# Patient Record
Sex: MALE | Race: WHITE | Employment: FULL TIME | URBAN - METROPOLITAN AREA
[De-identification: names, ages, dates, MRNs, and addresses within clinical notes are randomized per-mention and may not be internally consistent; named-entity substitution may affect disease eponyms.]

---

## 2017-01-26 ENCOUNTER — OFFICE VISIT (OUTPATIENT)
Dept: FAMILY MEDICINE CLINIC | Age: 37
End: 2017-01-26

## 2017-01-26 VITALS
BODY MASS INDEX: 26.35 KG/M2 | TEMPERATURE: 99.8 F | WEIGHT: 188.2 LBS | RESPIRATION RATE: 18 BRPM | DIASTOLIC BLOOD PRESSURE: 85 MMHG | HEIGHT: 71 IN | HEART RATE: 74 BPM | OXYGEN SATURATION: 98 % | SYSTOLIC BLOOD PRESSURE: 139 MMHG

## 2017-01-26 DIAGNOSIS — J45.20 MILD INTERMITTENT ASTHMA WITHOUT COMPLICATION: Primary | ICD-10-CM

## 2017-01-26 DIAGNOSIS — R73.03 PREDIABETES: ICD-10-CM

## 2017-01-26 DIAGNOSIS — F12.90 MARIJUANA USE: ICD-10-CM

## 2017-01-26 DIAGNOSIS — F32.A ANXIETY AND DEPRESSION: ICD-10-CM

## 2017-01-26 DIAGNOSIS — J44.9 CHRONIC OBSTRUCTIVE PULMONARY DISEASE, UNSPECIFIED COPD TYPE (HCC): ICD-10-CM

## 2017-01-26 DIAGNOSIS — E66.3 OVERWEIGHT (BMI 25.0-29.9): ICD-10-CM

## 2017-01-26 DIAGNOSIS — R61 NIGHT SWEATS: ICD-10-CM

## 2017-01-26 DIAGNOSIS — R11.2 NAUSEA AND VOMITING, INTRACTABILITY OF VOMITING NOT SPECIFIED, UNSPECIFIED VOMITING TYPE: ICD-10-CM

## 2017-01-26 DIAGNOSIS — F41.9 ANXIETY AND DEPRESSION: ICD-10-CM

## 2017-01-26 RX ORDER — SULFAMETHOXAZOLE AND TRIMETHOPRIM 400; 80 MG/1; MG/1
2 TABLET ORAL 2 TIMES DAILY
COMMUNITY
End: 2017-10-09 | Stop reason: ALTCHOICE

## 2017-01-26 NOTE — MR AVS SNAPSHOT
Visit Information Date & Time Provider Department Dept. Phone Encounter #  
 1/26/2017 11:15 AM Jennifer Bowles. Ingris Edwards MD [facility] [id number] Upcoming Health Maintenance Date Due DTaP/Tdap/Td series (2 - Td) 7/23/2025 Allergies as of 1/26/2017  Review Complete On: 1/26/2017 By: Jennifer Bowles. Ingris Edwards MD  
  
 Severity Noted Reaction Type Reactions Iodinated Contrast Media - Oral And Iv Dye High 03/02/2015   Systemic Hives  
 abruptly got multiple hives all over body Current Immunizations  Reviewed on 6/9/2016 Name Date Influenza Vaccine 9/9/2015 Pneumococcal Vaccine (Unspecified Type) 4/4/2012  2:25 PM  
 Tdap 7/23/2015 Not reviewed this visit You Were Diagnosed With   
  
 Codes Comments Mild intermittent asthma without complication    -  Primary ICD-10-CM: J45.20 ICD-9-CM: 493.90 Overweight (BMI 25.0-29. 9)     ICD-10-CM: I30.6 ICD-9-CM: 278.02 Anxiety and depression     ICD-10-CM: F41.9, F32.9 ICD-9-CM: 300.00, 311 Chronic obstructive pulmonary disease, unspecified COPD type ([facility]     ICD-10-CM: J44.9 ICD-9-CM: 012 Marijuana use     ICD-10-CM: F12.10 ICD-9-CM: 305.20 Night sweats     ICD-10-CM: R61 
ICD-9-CM: 780.8 Nausea and vomiting, intractability of vomiting not specified, unspecified vomiting type     ICD-10-CM: R11.2 ICD-9-CM: 787.01   
 Prediabetes     ICD-10-CM: R73.03 
ICD-9-CM: 790.29 Vitals BP Pulse Temp Resp Height(growth percentile) Weight(growth percentile) 139/85 (BP 1 Location: Left arm, BP Patient Position: Sitting) 74 99.8 °F (37.7 °C) (Oral) 18 5' 11\" (1.803 m) 188 lb 3.2 oz (85.4 kg) SpO2 BMI Smoking Status 98% 26.25 kg/m2 Former Smoker Vitals History BMI and BSA Data Body Mass Index Body Surface Area  
 26.25 kg/m 2 2.07 m 2 Preferred Pharmacy Pharmacy Name Phone  [place] [address] [phone] Your Updated Medication List  
  
   
This list is accurate as of: 1/26/17 12:16 PM.  Always use your most recent med list.  
  
  
  
  
 albuterol 90 mcg/actuation inhaler Commonly known as:  PROVENTIL HFA, VENTOLIN HFA, PROAIR HFA Take 2 Puffs by inhalation every four (4) hours as needed for Wheezing. BACTRIM  mg per tablet Generic drug:  trimethoprim-sulfamethoxazole Take 2 Tabs by mouth two (2) times a day. fluticasone-salmeterol 100-50 mcg/dose diskus inhaler Commonly known as:  ADVAIR Take 1 Puff by inhalation every twelve (12) hours. guaiFENesin  mg ER tablet Commonly known as:  Florentino & Florentino Take 600 mg by mouth daily. hydrOXYzine HCl 50 mg tablet Commonly known as:  ATARAX Take 1 Tab by mouth three (3) times daily as needed for Itching. PROTONIX 40 mg tablet Generic drug:  pantoprazole Take 40 mg by mouth daily. sertraline 100 mg tablet Commonly known as:  ZOLOFT Take 1 Tab by mouth daily. Indications: GENERALIZED ANXIETY DISORDER We Performed the Following CBC W/O DIFF [06663 CPT(R)] HEMOGLOBIN A1C WITH EAG [63525 CPT(R)] HEPATITIS C AB [65280 CPT(R)] HIV 1/2 AG/AB, 4TH GENERATION,W RFLX CONFIRM [[id number] Custom] METABOLIC PANEL, COMPREHENSIVE [27714 CPT(R)] TSH 3RD GENERATION [81163 CPT(R)] To-Do List   
 01/26/2017 PFT:  PFT DLCO   
  
 01/26/2017 PFT:  PULMONARY FUNCTION TEST   
  
 01/26/2017 Imaging:  XR CHEST PA LAT Patient Instructions TODAY, go to: LAB 
 CHECK OUT Please schedule the following appointments: 
· PFT, night sweat, GI, follow up with Dr. Abiel Stevens after Feb 16th ( on a Mon, Tues, or Wed) 
 
_____________________ Today you were seen for: 
 
For night sweats 
- have labs 
- you will need a TB test next time 
- have chest xray done For lung symptoms 
- have lung function tests done See GI 
 
 I recommend stopping marijuana 
_____________________ Review your health maintenance below. Make plans to return and address anything that is due or will be due soon. There are no preventive care reminders to display for this patient. Introducing [facility]! Temi Irizarry introduces [employer] patient portal. Now you can access parts of your medical record, email your doctor's office, and request medication refills online. 1. In your internet browser, go to [email or web address] 2. Click on the First Time User? Click Here link in the Sign In box. You will see the New Member Sign Up page. 3. Enter your [employer] Access Code exactly as it appears below. You will not need to use this code after youve completed the sign-up process. If you do not sign up before the expiration date, you must request a new code. · [employer] Access Code: [email or web address] Expires: 3/21/2017 11:17 AM 
 
4. Enter the last four digits of your Social Security Number (xxxx) and Date of Birth (mm/dd/yyyy) as indicated and click Submit. You will be taken to the next sign-up page. 5. Create a [employer] ID. This will be your [employer] login ID and cannot be changed, so think of one that is secure and easy to remember. 6. Create a [employer] password. You can change your password at any time. 7. Enter your Password Reset Question and Answer. This can be used at a later time if you forget your password. 8. Enter your e-mail address. You will receive e-mail notification when new information is available in [address] 9. Click Sign Up. You can now view and download portions of your medical record. 10. Click the Download Summary menu link to download a portable copy of your medical information. If you have questions, please visit the Frequently Asked Questions section of the [employer] website. Remember, [employer] is NOT to be used for urgent needs. For medical emergencies, dial 911. Now available from your iPhone and Android! Please provide this summary of care documentation to your next provider. Your primary care clinician is listed as Viry Bain. Sveta Womack. If you have any questions after today's visit, please call [phone].

## 2017-01-26 NOTE — PATIENT INSTRUCTIONS
TODAY, go to:   LAB   CHECK OUT    Please schedule the following appointments:  · PFT, night sweat, GI, follow up with Dr. Jonathan Franklin after Feb 16th ( on a Mon, Tues, or Wed)    _____________________     Today you were seen for:    For night sweats  - have labs  - you will need a TB test next time  - have chest xray done    For lung symptoms  - have lung function tests done    See GI    I recommend stopping marijuana  _____________________     Review your health maintenance below. Make plans to return and address anything that is due or will be due soon. There are no preventive care reminders to display for this patient.

## 2017-01-26 NOTE — PROGRESS NOTES
[address] Visit   Patient ID:   Riley Zavala is a 43 y.o. male. Assessment/Plan:    Dasia Mcneill was seen today for establish care. Diagnoses and all orders for this visit:    Mild intermittent asthma without complication  Chronic obstructive pulmonary disease, unspecified COPD type ([address]  pfts to eval lung function to direct therapy. Asthma vs COPD vs mixed  -     PULMONARY FUNCTION TEST; Future  -     PFT DLCO; Future    Overweight (BMI 25.0-29. 9)  Wt has improved. No longer obese. Body mass index is 26.25 kg/(m^2). Anxiety and depression  Managed with sertraline. Pt and partners feel that MJ helps mood also    Marijuana use  Discussed that this may contribute to his N/V, night sweats, lung sx. Encouraged to stop. Night sweats  Labs as ordered. CXR also. Will need PPD on f/u. Not today b/c going into weekend  -     TSH 3RD GENERATION  -     CBC W/O DIFF  -     XR CHEST PA LAT; Future  -     HEPATITIS C AB  -     HIV 1/2 AG/AB, 4TH GENERATION,W RFLX CONFIRM  -     METABOLIC PANEL, COMPREHENSIVE    Nausea and vomiting, intractability of vomiting not specified, unspecified vomiting type  F/u with GI as planned     Prediabetes  eval a1c.   -     HEMOGLOBIN A1C WITH EAG    Counselled pt on:  Patient health concerns. Patient was offered a choice/choices in the treatment plan today. Patient expresses understanding of the plan and agrees with recommendations. Patient Instructions   TODAY, go to:   LAB   CHECK OUT    Please schedule the following appointments:  · PFT, night sweat, GI, follow up with Dr. Ingris Edwards after Feb 16th ( on a Mon, Tues, or Wed)    _____________________     Today you were seen for:    For night sweats  - have labs  - you will need a TB test next time  - have chest xray done    For lung symptoms  - have lung function tests done    See GI    I recommend stopping marijuana  _____________________     Review your health maintenance below.  Make plans to return and address anything that is due or will be due soon. There are no preventive care reminders to display for this patient. ?  Subjective:   HPI:  Savanna Jewell is a 43 y.o. male being seen for:   Chief Complaint   Patient presents with   Venkatesh Gantitus Establish Care     Present with wife, girlfriend and son Leida Pereyra as well as a day care child today. Establish Care  Past medical history, surgical history, social history, family history, medications, allergies reviewed and updated. See below for more detail  Diaphoresis in sleep- has been in halfway X 2    COPD/Asthma  · Wakes with lots of mucus  · Taking advair and mucinex regularly    boil  · On bactrim from [facility]  · No i/d  · It is improving  · Changes bandaid after    Screening and Prevention Due:  There are no preventive care reminders to display for this patient. Past Medical History   Diagnosis Date    Asthma     Cellulitis and abscess of leg 7/9/2015    Chronic obstructive pulmonary disease ([facility] 2012     had collapsed lung and pna    Generalized anxiety disorder 7/9/2015    Hypokalemia 7/9/2015    Obesity (BMI 30-39. 9) 7/9/2015    Pneumomediastinum ([facility] 4/2/2012    Prediabetes 07/09/2015     6.0    Vomiting 09/10/2015      Kermit Emersonond-EGD&gstric emptying planned- seeing Dr. Sadiq Murrell- next Feb 2017       History reviewed. No pertinent past surgical history. Social History     Social History    Marital status:      Spouse name: see social hx    Number of children: 3    Years of education: N/A     Occupational History   [employer]     has worked in multiple areas     Social History Main Topics    Smoking status: Former Smoker     Packs/day: 0.25     Years: 22.00     Quit date: 10/23/2013    Smokeless tobacco: Never Used    Alcohol use No      Comment:  started drinking heavily in 2002. struggled with alcoholism. quit in 4892. no complicated w/d.     Drug use: Yes     Special: Marijuana      Comment: daily, using daily since about 2007  Sexual activity: Yes     Partners: Female     Birth control/ protection: IUD, Surgical      Comment: two partners. Other Topics Concern    Not on file     Social History Narrative    All live together in an apartment    [place]- g/f, 1 child- Robin Share (youngest) (+2 from a prior partner)    Marcela Patel- wife, 2 children         [place] 2012    Johnny Trammell [address] 2007    Surjit Balderas [facility]       Family History   Problem Relation Age of Onset    Diabetes Father     Diabetes Maternal Uncle     Cancer Maternal Uncle     Cancer Paternal Uncle     Cancer Mother      Uterine    Cancer Maternal Grandfather      leukemia    Diabetes Maternal Uncle     Kidney Disease Maternal Uncle      on dialysis      Review of Systems  Otherwise, per HPI  Active Problem List:  Patient Active Problem List   Diagnosis Code    Asthma J45.909    Chronic obstructive pulmonary disease ([facility] J44.9    Generalized anxiety disorder F41.1    Overweight (BMI 25.0-29. 9) E66.3    Anxiety and depression F41.9, F32.9    Prediabetes R73.03     ?   Objective:     Visit Vitals    /85 (BP 1 Location: Left arm, BP Patient Position: Sitting)    Pulse 74    Temp 99.8 °F (37.7 °C) (Oral)    Resp 18    Ht 5' 11\" (1.803 m)    Wt 188 lb 3.2 oz (85.4 kg)    SpO2 98%    BMI 26.25 kg/m2     PHQ 2 / 9, over the last two weeks 10/23/2015   Little interest or pleasure in doing things Not at all   Feeling down, depressed or hopeless Several days   Total Score PHQ 2 1   Trouble falling or staying asleep, or sleeping too much Several days   Feeling tired or having little energy Not at all   Poor appetite or overeating Not at all   Feeling bad about yourself - or that you are a failure or have let yourself or your family down Several days   Trouble concentrating on things such as school, work, reading or watching TV More than half the days   Moving or speaking so slowly that other people could have noticed; or the opposite being so fidgety that others notice Nearly every day   Thoughts of being better off dead, or hurting yourself in some way Not at all   PHQ 9 Score 8   How difficult have these problems made it for you to do your work, take care of your home and get along with others Not difficult at all       Physical Exam   Constitutional: He appears well-developed and well-nourished. No distress. Pulmonary/Chest: Effort normal.   Abdominal:   Abdomen with redundant skin, bandaid in place (over \"boil\")   Neurological: He is alert. Psychiatric: He has a normal mood and affect. His behavior is normal.     Allergies   Allergen Reactions    Iodinated Contrast Media - Oral And Iv Dye Hives     abruptly got multiple hives all over body      Prior to Admission medications    Medication Sig Start Date End Date Taking? Authorizing Provider   trimethoprim-sulfamethoxazole (BACTRIM)  mg per tablet Take 2 Tabs by mouth two (2) times a day. Yes Historical Provider   hydrOXYzine HCl (ATARAX) 50 mg tablet Take 1 Tab by mouth three (3) times daily as needed for Itching. 12/21/16  Yes Zay Giles NP   fluticasone-salmeterol (ADVAIR) 100-50 mcg/dose diskus inhaler Take 1 Puff by inhalation every twelve (12) hours. 12/21/16  Yes Zay Giles NP   sertraline (ZOLOFT) 100 mg tablet Take 1 Tab by mouth daily. Indications: GENERALIZED ANXIETY DISORDER 12/21/16  Yes Zay Giles NP   albuterol (PROVENTIL HFA, VENTOLIN HFA, PROAIR HFA) 90 mcg/actuation inhaler Take 2 Puffs by inhalation every four (4) hours as needed for Wheezing. 12/21/16  Yes Zay Giles NP   pantoprazole (PROTONIX) 40 mg tablet Take 40 mg by mouth daily. Yes Historical Provider   guaiFENesin ER (MUCINEX) 600 mg ER tablet Take 600 mg by mouth daily.    Yes Historical Provider

## 2017-01-26 NOTE — LETTER
6/23/2017 11:41 AM 
 
Mr. Briana Min Dr Gentry Krabbe [phone] [facility] [address] Dear Marly Warner: 
 
Please find your most recent results below. Resulted Orders TSH 3RD GENERATION Result Value Ref Range TSH 1.670 0.450 - 4.500 uIU/mL Narrative Performed at:  [address]  [id number] : Lilia Mata MD, Phone:  [phone] CBC W/O DIFF Result Value Ref Range WBC 6.1 3.4 - 10.8 x10E3/uL  
 RBC 4.42 4.14 - 5.80 x10E6/uL HGB 12.8 12.6 - 17.7 g/dL HCT 39.3 37.5 - 51.0 % MCV 89 79 - 97 fL  
 MCH 29.0 26.6 - 33.0 pg  
 MCHC 32.6 31.5 - 35.7 g/dL  
 RDW 14.7 12.3 - 15.4 % PLATELET 121 883 - 540 x10E3/uL Narrative Performed at:  [address]  [id number] : Lilia Mata MD, Phone:  [phone] HEPATITIS C AB Result Value Ref Range Hep C Virus Ab <0.1 0.0 - 0.9 s/co ratio Comment:  
                                     Negative:     < 0.8 Indeterminate: 0.8 - 0.9 Positive:     > 0.9 The CDC recommends that a positive HCV antibody result 
 be followed up with a HCV Nucleic Acid Amplification 
 test (137219). Narrative Performed at:  [address]  [id number] : Lilia Mata MD, Phone:  [phone] HIV 1/2 AG/AB, 4TH GENERATION,W RFLX CONFIRM Result Value Ref Range HIV SCREEN 4TH GENERATION WRFX Non Reactive Non Reactive Narrative Performed at:  [address]  [id number] : Lilia Mata MD, Phone:  [phone] METABOLIC PANEL, COMPREHENSIVE Result Value Ref Range Glucose 88 65 - 99 mg/dL BUN 13 6 - 20 mg/dL Creatinine 0.88 0.76 - 1.27 mg/dL GFR est non- >59 mL/min/1.73  GFR est  >59 mL/min/1.73  
 BUN/Creatinine ratio 15 8 - 19 Sodium 137 134 - 144 mmol/L Potassium 4.8 3.5 - 5.2 mmol/L Chloride 98 96 - 106 mmol/L  
 CO2 24 18 - 29 mmol/L Calcium 9.5 8.7 - 10.2 mg/dL Protein, total 7.4 6.0 - 8.5 g/dL Albumin 4.4 3.5 - 5.5 g/dL GLOBULIN, TOTAL 3.0 1.5 - 4.5 g/dL A-G Ratio 1.5 1.1 - 2.5 Bilirubin, total 0.3 0.0 - 1.2 mg/dL Alk. phosphatase 72 39 - 117 IU/L  
 AST (SGOT) 22 0 - 40 IU/L  
 ALT (SGPT) 30 0 - 44 IU/L Narrative Performed at:  [address]  [id number] : Neil Murdock MD, Phone:  [phone] HEMOGLOBIN A1C WITH EAG Result Value Ref Range Hemoglobin A1c 5.5 4.8 - 5.6 % Comment:  
            Pre-diabetes: 5.7 - 6.4 Diabetes: >6.4 Glycemic control for adults with diabetes: <7.0 Estimated average glucose 111 mg/dL Narrative Performed at:  [address]  [id number] : Neil Murdock MD, Phone:  [phone] RECOMMENDATIONS: 
 
Your thyroid test is normal.  
Your red blood cell count is normal.  
Your white blood cell count is normal.  
Your platelet count is normal.  
Hepatitis C test is negative. HIV is negative. The test for HIV may not reflect exposures in the last 3-6 months. If you have had new partners or exposures in that time, I recommend a repeat test for HIV in 6 months. Your electrolytes are normal.  
Your kidney function is normal.  
Your liver function is normal.  
You do not have diabetes. Please call me if you have any questions: [phone] Sincerely, 
 
 
[address]  Altagracia Lepe MD

## 2017-01-26 NOTE — PROGRESS NOTES
Chief Complaint   Patient presents with   [facility] Establish Care     1. Have you been to the ER, urgent care clinic since your last visit? Hospitalized since your last visit? No    2. Have you seen or consulted any other health care providers outside of the [address] [place] [address] since your last visit? Include any pap smears or colon screening. No  I have reviewed Health Maintenance with the patient and updated. Advance Care Planning information reviewed and given to the patient.

## 2017-01-27 LAB
ALBUMIN SERPL-MCNC: 4.4 G/DL (ref 3.5–5.5)
ALBUMIN/GLOB SERPL: 1.5 {RATIO} (ref 1.1–2.5)
ALP SERPL-CCNC: 72 IU/L (ref 39–117)
ALT SERPL-CCNC: 30 IU/L (ref 0–44)
AST SERPL-CCNC: 22 IU/L (ref 0–40)
BILIRUB SERPL-MCNC: 0.3 MG/DL (ref 0–1.2)
BUN SERPL-MCNC: 13 MG/DL (ref 6–20)
BUN/CREAT SERPL: 15 (ref 8–19)
CALCIUM SERPL-MCNC: 9.5 MG/DL (ref 8.7–10.2)
CHLORIDE SERPL-SCNC: 98 MMOL/L (ref 96–106)
CO2 SERPL-SCNC: 24 MMOL/L (ref 18–29)
CREAT SERPL-MCNC: 0.88 MG/DL (ref 0.76–1.27)
ERYTHROCYTE [DISTWIDTH] IN BLOOD BY AUTOMATED COUNT: 14.7 % (ref 12.3–15.4)
EST. AVERAGE GLUCOSE BLD GHB EST-MCNC: 111 MG/DL
GLOBULIN SER CALC-MCNC: 3 G/DL (ref 1.5–4.5)
GLUCOSE SERPL-MCNC: 88 MG/DL (ref 65–99)
HBA1C MFR BLD: 5.5 % (ref 4.8–5.6)
HCT VFR BLD AUTO: 39.3 % (ref 37.5–51)
HCV AB S/CO SERPL IA: <0.1 S/CO RATIO (ref 0–0.9)
HGB BLD-MCNC: 12.8 G/DL (ref 12.6–17.7)
HIV 1+2 AB+HIV1 P24 AG SERPL QL IA: NON REACTIVE
MCH RBC QN AUTO: 29 PG (ref 26.6–33)
MCHC RBC AUTO-ENTMCNC: 32.6 G/DL (ref 31.5–35.7)
MCV RBC AUTO: 89 FL (ref 79–97)
PLATELET # BLD AUTO: 331 X10E3/UL (ref 150–379)
POTASSIUM SERPL-SCNC: 4.8 MMOL/L (ref 3.5–5.2)
PROT SERPL-MCNC: 7.4 G/DL (ref 6–8.5)
RBC # BLD AUTO: 4.42 X10E6/UL (ref 4.14–5.8)
SODIUM SERPL-SCNC: 137 MMOL/L (ref 134–144)
TSH SERPL DL<=0.005 MIU/L-ACNC: 1.67 UIU/ML (ref 0.45–4.5)
WBC # BLD AUTO: 6.1 X10E3/UL (ref 3.4–10.8)

## 2017-03-11 DIAGNOSIS — F32.A ANXIETY AND DEPRESSION: ICD-10-CM

## 2017-03-11 DIAGNOSIS — F41.9 ANXIETY AND DEPRESSION: ICD-10-CM

## 2017-03-15 RX ORDER — HYDROXYZINE 50 MG/1
TABLET, FILM COATED ORAL
Qty: 90 TAB | Refills: 0 | Status: SHIPPED | OUTPATIENT
Start: 2017-03-15 | End: 2017-10-09 | Stop reason: SDUPTHER

## 2017-05-20 ENCOUNTER — HOSPITAL ENCOUNTER (EMERGENCY)
Age: 37
Discharge: HOME | End: 2017-05-21
Attending: EMERGENCY MEDICINE
Payer: COMMERCIAL

## 2017-05-20 DIAGNOSIS — L25.9 CONTACT DERMATITIS AND OTHER ECZEMA, DUE TO UNSPECIFIED CAUSE: ICD-10-CM

## 2017-05-20 DIAGNOSIS — K52.9 GASTROENTERITIS, ACUTE: Primary | ICD-10-CM

## 2017-05-20 LAB
ALBUMIN SERPL BCP-MCNC: 4.3 G/DL (ref 3.5–5)
ALBUMIN/GLOB SERPL: 1.1 {RATIO} (ref 1.1–2.2)
ALP SERPL-CCNC: 73 U/L (ref 45–117)
ALT SERPL-CCNC: 37 U/L (ref 12–78)
ANION GAP BLD CALC-SCNC: 11 MMOL/L (ref 5–15)
AST SERPL W P-5'-P-CCNC: 14 U/L (ref 15–37)
BASOPHILS # BLD AUTO: 0 K/UL (ref 0–0.1)
BASOPHILS # BLD: 0 % (ref 0–1)
BILIRUB SERPL-MCNC: 0.7 MG/DL (ref 0.2–1)
BUN SERPL-MCNC: 15 MG/DL (ref 6–20)
BUN/CREAT SERPL: 17 (ref 12–20)
CALCIUM SERPL-MCNC: 9.7 MG/DL (ref 8.5–10.1)
CHLORIDE SERPL-SCNC: 105 MMOL/L (ref 97–108)
CO2 SERPL-SCNC: 21 MMOL/L (ref 21–32)
CREAT SERPL-MCNC: 0.87 MG/DL (ref 0.7–1.3)
EOSINOPHIL # BLD: 0 K/UL (ref 0–0.4)
EOSINOPHIL NFR BLD: 0 % (ref 0–7)
ERYTHROCYTE [DISTWIDTH] IN BLOOD BY AUTOMATED COUNT: 13.4 % (ref 11.5–14.5)
GLOBULIN SER CALC-MCNC: 3.9 G/DL (ref 2–4)
GLUCOSE SERPL-MCNC: 172 MG/DL (ref 65–100)
HCT VFR BLD AUTO: 40 % (ref 36.6–50.3)
HGB BLD-MCNC: 13.8 G/DL (ref 12.1–17)
LIPASE SERPL-CCNC: 213 U/L (ref 73–393)
LYMPHOCYTES # BLD AUTO: 11 % (ref 12–49)
LYMPHOCYTES # BLD: 1.5 K/UL (ref 0.8–3.5)
MCH RBC QN AUTO: 29.9 PG (ref 26–34)
MCHC RBC AUTO-ENTMCNC: 34.5 G/DL (ref 30–36.5)
MCV RBC AUTO: 86.8 FL (ref 80–99)
MONOCYTES # BLD: 1.2 K/UL (ref 0–1)
MONOCYTES NFR BLD AUTO: 9 % (ref 5–13)
NEUTS SEG # BLD: 10 K/UL (ref 1.8–8)
NEUTS SEG NFR BLD AUTO: 80 % (ref 32–75)
PLATELET # BLD AUTO: 268 K/UL (ref 150–400)
POTASSIUM SERPL-SCNC: 3.8 MMOL/L (ref 3.5–5.1)
PROT SERPL-MCNC: 8.2 G/DL (ref 6.4–8.2)
RBC # BLD AUTO: 4.61 M/UL (ref 4.1–5.7)
SODIUM SERPL-SCNC: 137 MMOL/L (ref 136–145)
WBC # BLD AUTO: 12.7 K/UL (ref 4.1–11.1)

## 2017-05-20 PROCEDURE — 81001 URINALYSIS AUTO W/SCOPE: CPT | Performed by: EMERGENCY MEDICINE

## 2017-05-20 PROCEDURE — 96375 TX/PRO/DX INJ NEW DRUG ADDON: CPT

## 2017-05-20 PROCEDURE — 85025 COMPLETE CBC W/AUTO DIFF WBC: CPT | Performed by: EMERGENCY MEDICINE

## 2017-05-20 PROCEDURE — C9113 INJ PANTOPRAZOLE SODIUM, VIA: HCPCS | Performed by: EMERGENCY MEDICINE

## 2017-05-20 PROCEDURE — 83690 ASSAY OF LIPASE: CPT | Performed by: EMERGENCY MEDICINE

## 2017-05-20 PROCEDURE — 96374 THER/PROPH/DIAG INJ IV PUSH: CPT

## 2017-05-20 PROCEDURE — 80053 COMPREHEN METABOLIC PANEL: CPT | Performed by: EMERGENCY MEDICINE

## 2017-05-20 PROCEDURE — 74011000250 HC RX REV CODE- 250: Performed by: EMERGENCY MEDICINE

## 2017-05-20 PROCEDURE — 36415 COLL VENOUS BLD VENIPUNCTURE: CPT | Performed by: EMERGENCY MEDICINE

## 2017-05-20 PROCEDURE — 74011250636 HC RX REV CODE- 250/636: Performed by: EMERGENCY MEDICINE

## 2017-05-20 PROCEDURE — 99284 EMERGENCY DEPT VISIT MOD MDM: CPT

## 2017-05-20 PROCEDURE — 96361 HYDRATE IV INFUSION ADD-ON: CPT

## 2017-05-20 RX ORDER — ONDANSETRON 2 MG/ML
8 INJECTION INTRAMUSCULAR; INTRAVENOUS
Status: COMPLETED | OUTPATIENT
Start: 2017-05-20 | End: 2017-05-20

## 2017-05-20 RX ADMIN — SODIUM CHLORIDE 2000 ML: 900 INJECTION, SOLUTION INTRAVENOUS at 22:43

## 2017-05-20 RX ADMIN — ONDANSETRON 8 MG: 2 INJECTION INTRAMUSCULAR; INTRAVENOUS at 22:44

## 2017-05-20 RX ADMIN — SODIUM CHLORIDE 40 MG: 9 INJECTION INTRAMUSCULAR; INTRAVENOUS; SUBCUTANEOUS at 22:43

## 2017-05-20 NOTE — Clinical Note
Continue the medications prescribed for the rash- I agree it looks like a contact dermatitis/poison ivy. You can take the Zofran every 6-8 hours and/or the phenergan every 8 hours for nausea. If your symptoms/condition is worsening despite the medica tions- return here. Follow up with your primary doctor in 2-3 days if no better.

## 2017-05-20 NOTE — LETTER
[address] 
[address] [facility] [address] [facility] [address] 
[phone] Work/School Note Date: 5/20/2017 To Whom It May concern: 
 
Annabelle Patten was seen and treated today in the emergency room by the following provider(s): 
Attending Provider: Hany Estrada MD. Annabelle Patten may return to work on Tuesday, May 23, 2017.  
 
Sincerely, 
 
 
 
 
Hany Estrada MD

## 2017-05-21 VITALS
RESPIRATION RATE: 18 BRPM | HEIGHT: 71 IN | TEMPERATURE: 98.2 F | OXYGEN SATURATION: 94 % | DIASTOLIC BLOOD PRESSURE: 83 MMHG | SYSTOLIC BLOOD PRESSURE: 166 MMHG | WEIGHT: 188 LBS | BODY MASS INDEX: 26.32 KG/M2

## 2017-05-21 LAB
APPEARANCE UR: ABNORMAL
BACTERIA URNS QL MICRO: NEGATIVE /HPF
BILIRUB UR QL: NEGATIVE
COLOR UR: ABNORMAL
EPITH CASTS URNS QL MICRO: ABNORMAL /LPF
GLUCOSE UR STRIP.AUTO-MCNC: NEGATIVE MG/DL
HGB UR QL STRIP: NEGATIVE
HYALINE CASTS URNS QL MICRO: ABNORMAL /LPF (ref 0–5)
KETONES UR QL STRIP.AUTO: 15 MG/DL
LEUKOCYTE ESTERASE UR QL STRIP.AUTO: NEGATIVE
NITRITE UR QL STRIP.AUTO: NEGATIVE
PH UR STRIP: 7 [PH] (ref 5–8)
PROT UR STRIP-MCNC: NEGATIVE MG/DL
RBC #/AREA URNS HPF: ABNORMAL /HPF (ref 0–5)
SP GR UR REFRACTOMETRY: 1.02 (ref 1–1.03)
UROBILINOGEN UR QL STRIP.AUTO: 0.2 EU/DL (ref 0.2–1)
WBC URNS QL MICRO: ABNORMAL /HPF (ref 0–4)

## 2017-05-21 PROCEDURE — 74011250636 HC RX REV CODE- 250/636: Performed by: EMERGENCY MEDICINE

## 2017-05-21 RX ORDER — PROMETHAZINE HYDROCHLORIDE 25 MG/1
25 TABLET ORAL
Qty: 12 TAB | Refills: 0 | Status: SHIPPED | OUTPATIENT
Start: 2017-05-21 | End: 2018-01-27

## 2017-05-21 RX ORDER — ONDANSETRON HYDROCHLORIDE 8 MG/1
8 TABLET, FILM COATED ORAL
Qty: 8 TAB | Refills: 0 | Status: SHIPPED | OUTPATIENT
Start: 2017-05-21 | End: 2019-07-18

## 2017-05-21 RX ADMIN — PROMETHAZINE HYDROCHLORIDE 12.5 MG: 25 INJECTION INTRAMUSCULAR; INTRAVENOUS at 01:33

## 2017-05-21 NOTE — ED TRIAGE NOTES
Pt presents with complaints of nausea and vomiting. Pt has rash on bilateral lower legs since last week that is spreading. Pt  reports him \"having ticks on him last week\" and fever Sunday.

## 2017-05-21 NOTE — DISCHARGE INSTRUCTIONS
Dermatitis: Care Instructions  Your Care Instructions  Dermatitis is the general name used for any rash or inflammation of the skin. Different kinds of dermatitis cause different kinds of rashes. Common causes of a rash include new medicines, plants (such as poison oak or poison ivy), heat, and stress. Certain illnesses can also cause a rash. An allergic reaction to something that touches your skin, such as latex, nickel, or poison ivy, is called contact dermatitis. Contact dermatitis may also be caused by something that irritates the skin, such as bleach, a chemical, or soap. These types of rashes cannot be spread from person to person. How long your rash will last depends on what caused it. Rashes may last a few days or months. Follow-up care is a key part of your treatment and safety. Be sure to make and go to all appointments, and call your doctor if you are having problems. It's also a good idea to know your test results and keep a list of the medicines you take. How can you care for yourself at home? · Do not scratch the rash. Cut your nails short, and file them smooth. Or wear gloves if this helps keep you from scratching. · Wash the area with water only. Pat dry. · Put cold, wet cloths on the rash to reduce itching. · Keep cool, and stay out of the sun. · Leave the rash open to the air as much as possible. · If the rash itches, use hydrocortisone cream. Follow the directions on the label. Calamine lotion may help for plant rashes. · Take an over-the-counter antihistamine, such as diphenhydramine (Benadryl) or loratadine (Claritin), to help calm the itching. Read and follow all instructions on the label. · If your doctor prescribed a cream, use it as directed. If your doctor prescribed medicine, take it exactly as directed. When should you call for help?   Call your doctor now or seek immediate medical care if:  · You have symptoms of infection, such as:  ¨ Increased pain, swelling, warmth, or redness. ¨ Red streaks leading from the area. ¨ Pus draining from the area. ¨ A fever. · You have joint pain along with the rash. Watch closely for changes in your health, and be sure to contact your doctor if:  · Your rash is changing or getting worse. · You are not getting better as expected. Where can you learn more? Go to http://garcia-carlos.info/. Enter [id number] in the search box to learn more about \"Dermatitis: Care Instructions. \"  Current as of: October 13, 2016  Content Version: 11.2  © 6502-3816 [employer]. Care instructions adapted under license by [employer] (which disclaims liability or warranty for this information). If you have questions about a medical condition or this instruction, always ask your healthcare professional. [address] any warranty or liability for your use of this information. Gastroenteritis: Care Instructions  Your Care Instructions  Gastroenteritis is an illness that may cause nausea, vomiting, and diarrhea. It is sometimes called \"stomach flu. \" It can be caused by bacteria or a virus. You will probably begin to feel better in 1 to 2 days. In the meantime, get plenty of rest and make sure you do not become dehydrated. Dehydration occurs when your body loses too much fluid. Follow-up care is a key part of your treatment and safety. Be sure to make and go to all appointments, and call your doctor if you are having problems. Its also a good idea to know your test results and keep a list of the medicines you take. How can you care for yourself at home? · If your doctor prescribed antibiotics, take them as directed. Do not stop taking them just because you feel better. You need to take the full course of antibiotics. · Drink plenty of fluids to prevent dehydration, enough so that your urine is light yellow or clear like water.  Choose water and other caffeine-free clear liquids until you feel better. If you have kidney, heart, or liver disease and have to limit fluids, talk with your doctor before you increase your fluid intake. · Drink fluids slowly, in frequent, small amounts, because drinking too much too fast can cause vomiting. · Begin eating mild foods, such as dry toast, yogurt, applesauce, bananas, and rice. Avoid spicy, hot, or high-fat foods, and do not drink alcohol or caffeine for a day or two. Do not drink milk or eat ice cream until you are feeling better. How to prevent gastroenteritis  · Keep hot foods hot and cold foods cold. · Do not eat meats, dressings, salads, or other foods that have been kept at room temperature for more than 2 hours. · Use a thermometer to check your refrigerator. It should be between 34°F and 40°F.  · Defrost meats in the refrigerator or microwave, not on the kitchen counter. · Keep your hands and your kitchen clean. Wash your hands, cutting boards, and countertops with hot soapy water frequently. · Cook meat until it is well done. · Do not eat raw eggs or uncooked sauces made with raw eggs. · Do not take chances. If food looks or tastes spoiled, throw it out. When should you call for help? Call 911 anytime you think you may need emergency care. For example, call if:  · You vomit blood or what looks like coffee grounds. · You passed out (lost consciousness). · You pass maroon or very bloody stools. Call your doctor now or seek immediate medical care if:  · You have severe belly pain. · You have signs of needing more fluids. You have sunken eyes, a dry mouth, and pass only a little dark urine. · You feel like you are going to faint. · You have increased belly pain that does not go away in 1 to 2 days. · You have new or increased nausea, or you are vomiting. · You have a new or higher fever. · Your stools are black and tarlike or have streaks of blood.   Watch closely for changes in your health, and be sure to contact your doctor if:  · You are dizzy or lightheaded. · You urinate less than usual, or your urine is dark yellow or brown. · You do not feel better with each day that goes by. Where can you learn more? Go to http://garcia-carlos.info/. Enter N142 in the search box to learn more about \"Gastroenteritis: Care Instructions. \"  Current as of: May 24, 2016  Content Version: 11.2  © 1551-4069 [employer]. Care instructions adapted under license by [employer] (which disclaims liability or warranty for this information). If you have questions about a medical condition or this instruction, always ask your healthcare professional. [place] [address] any warranty or liability for your use of this information. We hope that we have addressed all of your medical concerns. The examination and treatment you received in the Emergency Department were for an emergent problem and were not intended as complete care. It is important that you follow up with your healthcare provider(s) for ongoing care. If your symptoms worsen or do not improve as expected, and you are unable to reach your usual health care provider(s), you should return to the Emergency Department. Today's healthcare is undergoing tremendous change, and patient satisfaction surveys are one of the many tools to assess the quality of medical care. You may receive a survey from the [employer] regarding your experience in the Emergency Department. I hope that your experience has been completely positive, particularly the medical care that I provided. As such, please participate in the survey; anything less than excellent does not meet my expectations or intentions. [facility] and [facility] participate in nationally recognized quality of care measures.   If your blood pressure is greater than 120/80, as reported below, we urge that you seek medical care to address the potential of high blood pressure, commonly known as hypertension. Hypertension can be hereditary or can be caused by certain medical conditions, pain, stress, or \"white coat syndrome. \"       Please make an appointment with your health care provider(s) for follow up of your Emergency Department visit. VITALS:   Patient Vitals for the past 8 hrs:   Temp Resp BP SpO2   05/20/17 2217 98.2 °F (36.8 °C) 18 143/87 97 %          Thank you for allowing us to provide you with medical care today. We realize that you have many choices for your emergency care needs. Please choose us in the future for any continued health care needs. Nessa Castanon MD    [facility], [facility].   Office: [phone]            Recent Results (from the past 24 hour(s))   CBC WITH AUTOMATED DIFF    Collection Time: 05/20/17 10:35 PM   Result Value Ref Range    WBC 12.7 (H) 4.1 - 11.1 K/uL    RBC 4.61 4.10 - 5.70 M/uL    HGB 13.8 12.1 - 17.0 g/dL    HCT 40.0 36.6 - 50.3 %    MCV 86.8 80.0 - 99.0 FL    MCH 29.9 26.0 - 34.0 PG    MCHC 34.5 30.0 - 36.5 g/dL    RDW 13.4 11.5 - 14.5 %    PLATELET 500 568 - 585 K/uL    NEUTROPHILS 80 (H) 32 - 75 %    LYMPHOCYTES 11 (L) 12 - 49 %    MONOCYTES 9 5 - 13 %    EOSINOPHILS 0 0 - 7 %    BASOPHILS 0 0 - 1 %    ABS. NEUTROPHILS 10.0 (H) 1.8 - 8.0 K/UL    ABS. LYMPHOCYTES 1.5 0.8 - 3.5 K/UL    ABS. MONOCYTES 1.2 (H) 0.0 - 1.0 K/UL    ABS. EOSINOPHILS 0.0 0.0 - 0.4 K/UL    ABS.  BASOPHILS 0.0 0.0 - 0.1 K/UL   METABOLIC PANEL, COMPREHENSIVE    Collection Time: 05/20/17 10:35 PM   Result Value Ref Range    Sodium 137 136 - 145 mmol/L    Potassium 3.8 3.5 - 5.1 mmol/L    Chloride 105 97 - 108 mmol/L    CO2 21 21 - 32 mmol/L    Anion gap 11 5 - 15 mmol/L    Glucose 172 (H) 65 - 100 mg/dL    BUN 15 6 - 20 MG/DL    Creatinine 0.87 0.70 - 1.30 MG/DL    BUN/Creatinine ratio 17 12 - 20      GFR est AA >60 >60 ml/min/1.73m2    GFR est non-AA >60 >60 ml/min/1.73m2    Calcium 9.7 8.5 - 10.1 MG/DL    Bilirubin, total 0.7 0.2 - 1.0 MG/DL    ALT (SGPT) 37 12 - 78 U/L    AST (SGOT) 14 (L) 15 - 37 U/L    Alk. phosphatase 73 45 - 117 U/L    Protein, total 8.2 6.4 - 8.2 g/dL    Albumin 4.3 3.5 - 5.0 g/dL    Globulin 3.9 2.0 - 4.0 g/dL    A-G Ratio 1.1 1.1 - 2.2     LIPASE    Collection Time: 05/20/17 10:35 PM   Result Value Ref Range    Lipase 213 73 - 393 U/L   URINALYSIS W/MICROSCOPIC    Collection Time: 05/20/17 11:59 PM   Result Value Ref Range    Color YELLOW/STRAW      Appearance CLOUDY (A) CLEAR      Specific gravity 1.017 1.003 - 1.030      pH (UA) 7.0 5.0 - 8.0      Protein NEGATIVE  NEG mg/dL    Glucose NEGATIVE  NEG mg/dL    Ketone 15 (A) NEG mg/dL    Bilirubin NEGATIVE  NEG      Blood NEGATIVE  NEG      Urobilinogen 0.2 0.2 - 1.0 EU/dL    Nitrites NEGATIVE  NEG      Leukocyte Esterase NEGATIVE  NEG      WBC 0-4 0 - 4 /hpf    RBC 0-5 0 - 5 /hpf    Epithelial cells FEW FEW /lpf    Bacteria NEGATIVE  NEG /hpf    Hyaline cast 0-2 0 - 5 /lpf       No results found.

## 2017-05-21 NOTE — ED NOTES
Patient drank 1/2 can of ginger-guillermo without difficulty. Patient refuses to try saltine crackers.

## 2017-05-21 NOTE — ED PROVIDER NOTES
HPI Comments: 42 y.o. male with past medical history significant for Asthma, Cellulitis, and COPDwho presents from Home with chief complaint of Evaluation for a Rash. Patient is a difficult historian and therefor the patient's history is limited. Patient states onset \"a week ago\" of a rash on his bilateral legs. Pt states gradually worsening symptoms since onset described as \"spreading of the rash with associated itching\". Patient was seen at a Clinic yesterday for symptoms and was started on steroids and given a topical cream. Pt states onset \"earlier today prior to arrival\" of  generalized abdominal pain, nausea, vomiting and diarrhea (x3-4 episodes). Patient's wife reports \"a week ago\" the patient was \"covered with tick bites\" after being in the woods. Pt is unable to recall when he removed the tick or where the tick was. Patient's wife also notes that the patient had a measured fever \"six days ago\", however this has resolved since initial onset. Pt's wife reports pt is a frequent marijuana user. Pt denies recent contact with illness. Pt denies use of daily medications. Pt denies any significant changes in routine. Pt denies chills, cough, congestion, SOB, chest pain, difficulty urinating, or dysuria. Pt denies any other acute medical complaints. There are no other acute medical concerns at this time. Social hx: Drug Use: Yes, Marijuana     PCP: Everett Moya. Peggy Kyle MD    Note written by Wu Ahumada, as dictated by Mohan Butcher MD 10:30 PM    The history is provided by the patient and the spouse. Past Medical History:   Diagnosis Date    Asthma     Cellulitis and abscess of leg 7/9/2015    Chronic obstructive pulmonary disease ([address] 2012    had collapsed lung and pna    Generalized anxiety disorder 7/9/2015    Hypokalemia 7/9/2015    Obesity (BMI 30-39. 9) 7/9/2015    Pneumomediastinum ([address] 4/2/2012    Prediabetes 07/09/2015    6.0    Vomiting 09/10/2015     Kermit Iron-EGD&gstric emptying planned- seeing Dr. Junito Good- next Feb 2017       History reviewed. No pertinent surgical history. Family History:   Problem Relation Age of Onset    Diabetes Father     Diabetes Maternal Uncle     Cancer Maternal Uncle     Cancer Paternal Uncle     Cancer Mother      Uterine    Cancer Maternal Grandfather      leukemia    Diabetes Maternal Uncle     Kidney Disease Maternal Uncle      on dialysis       Social History     Social History    Marital status:      Spouse name: see social hx    Number of children: 3    Years of education: N/A     Occupational History   [employer]     has worked in multiple areas     Social History Main Topics    Smoking status: Former Smoker     Packs/day: 0.25     Years: 22.00     Quit date: 10/23/2013    Smokeless tobacco: Never Used    Alcohol use No      Comment:  started drinking heavily in 2002. struggled with alcoholism. quit in 4366. no complicated w/d.  Drug use: Yes     Special: Marijuana      Comment: daily, using daily since about 2007    Sexual activity: Yes     Partners: Female     Birth control/ protection: IUD, Surgical      Comment: two partners. Other Topics Concern    Not on file     Social History Narrative    All live together in an apartment    [place]- g/f, 1 child- Particrose Chan (youngest) (+2 from a prior partner)    Sharon Pizarro- wife, 2 children         Prosper 2012    Arturo Mccartney 2007    Olga Lewis 2007    Donnie Hernandez 2002         ALLERGIES: Iodinated contrast media - oral and iv dye    Review of Systems   Constitutional: Positive for fever (Resolved). HENT: Negative for congestion. Respiratory: Negative for cough and shortness of breath. Cardiovascular: Negative for chest pain. Gastrointestinal: Positive for abdominal pain, diarrhea, nausea and vomiting. Genitourinary: Negative for difficulty urinating and dysuria. Skin: Positive for rash. All other systems reviewed and are negative.       Vitals:    05/20/17 2217 BP: 143/87   Resp: 18   Temp: 98.2 °F (36.8 °C)   SpO2: 97%   Weight: 85.3 kg (188 lb)   Height: 5' 11\" (1.803 m)            Physical Exam   Constitutional: He is oriented to person, place, and time. He appears well-developed and well-nourished. HENT:   Head: Normocephalic and atraumatic. Nose: Nose normal.   Eyes: Conjunctivae and EOM are normal. Pupils are equal, round, and reactive to light. Neck: Normal range of motion. Neck supple. Cardiovascular: Normal rate, regular rhythm, normal heart sounds and intact distal pulses. Pulmonary/Chest: Effort normal and breath sounds normal.   Abdominal: Soft. Bowel sounds are normal. There is no tenderness. Musculoskeletal: Normal range of motion. Neurological: He is alert and oriented to person, place, and time. He has normal reflexes. Poor eye contact, refusing to answer questions   Skin: Skin is warm and dry. Rash noted. Raised non blanchable erythematous rash with patches and streaks to bilateral lower extremities. Which jackson his wrist, ankle, palms and soles. Non petechial consist with contact dermatitis. Nursing note and vitals reviewed. Note written by Wu Dorsey, as dictated by Timmy Fisher MD 10:30 PM      MDM  Number of Diagnoses or Management Options    ED Course       Procedures      Feeling better. Labs reasuring, exam reassuring. Rash c/w contact dermatitis, not suggestive of tick born disease. Mild increased WBC likely demargination -v- steroid use. Recurrent n/v likely due to marijuana use. Zofran/Phenergan Rx provided for home.

## 2017-06-22 NOTE — PROGRESS NOTES
I encourage you to schedule follow to continue to evaluate your medical concerns. Your thyroid test is normal.   Your red blood cell count is normal.   Your white blood cell count is normal.   Your platelet count is normal.   Hepatitis C test is negative. HIV is negative. The test for HIV may not reflect exposures in the last 3-6 months. If you have had new partners or exposures in that time, I recommend a repeat test for HIV in 6 months. Your electrolytes are normal.   Your kidney function is normal.   Your liver function is normal.  You do not have diabetes. _____________________   Please include comments and results in a letter and mail to patient.

## 2017-06-23 NOTE — PROGRESS NOTES
This writer called patient's home number that was listed in chart to give patient lab results. A woman answered the phone (did not give her name). The woman stated that this writer could give the lab results to her instead of him. Asked the woman what is her name to verify if she was on his PHI. The woman refused to give her name and stated that she should be on there. Asked her again, for her name so I could check to see if she is on PHI. She refused a second time, informed the lady to have the patient call this writer at [facility]. The woman told me to hold on because the patient is outside doing chores. Asked patient to state his name and birthday before I tried to give him his results, patient became irate on the phone yelling at this writer stating that I can give his girlfriend his lab results. Informed patient I wasn't able to do so until a PHI was completed with her name listed, patient then hung up the phone.

## 2017-10-09 ENCOUNTER — OFFICE VISIT (OUTPATIENT)
Dept: FAMILY MEDICINE CLINIC | Age: 37
End: 2017-10-09

## 2017-10-09 VITALS
DIASTOLIC BLOOD PRESSURE: 74 MMHG | OXYGEN SATURATION: 98 % | WEIGHT: 186 LBS | RESPIRATION RATE: 18 BRPM | HEIGHT: 71 IN | SYSTOLIC BLOOD PRESSURE: 118 MMHG | BODY MASS INDEX: 26.04 KG/M2 | TEMPERATURE: 98 F | HEART RATE: 62 BPM

## 2017-10-09 DIAGNOSIS — R61 NIGHT SWEATS: ICD-10-CM

## 2017-10-09 DIAGNOSIS — F32.A ANXIETY AND DEPRESSION: Primary | ICD-10-CM

## 2017-10-09 DIAGNOSIS — F41.9 ANXIETY AND DEPRESSION: Primary | ICD-10-CM

## 2017-10-09 RX ORDER — SERTRALINE HYDROCHLORIDE 50 MG/1
100 TABLET, FILM COATED ORAL DAILY
Qty: 60 TAB | Refills: 1 | Status: SHIPPED | OUTPATIENT
Start: 2017-10-09 | End: 2018-03-27 | Stop reason: SDUPTHER

## 2017-10-09 RX ORDER — HYDROXYZINE 50 MG/1
50 TABLET, FILM COATED ORAL
Qty: 90 TAB | Refills: 0 | Status: SHIPPED | OUTPATIENT
Start: 2017-10-09 | End: 2018-09-16 | Stop reason: SDUPTHER

## 2017-10-09 NOTE — PATIENT INSTRUCTIONS
TODAY, please go to:   CHECK OUT    LAB    Please schedule the following appointments at [address] [place] [address]:  · Complete Physical Exam and lab follow up with Dr. Kelsea Galeano in April 2018  · Lab visit, fasting the week before our visit.     Today's Plan:    Week 1 and 2: 1 1/2 tablets daily (75mg)  Week 3 and 4: 1 tab daily (50mg)  Week 5 and 6: 1/2 tab daily (25mg)  Week 7 and 8: 1/2 tab EVERY OTHER DAY (25mg)  Then discontinue
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
## 2017-10-09 NOTE — MR AVS SNAPSHOT
Visit Information Date & Time Provider Department Dept. Phone Encounter #  
 10/9/2017  9:20 AM Carolina Hayward. Altagracia Lepe MD [facility] 74 [phone] [id number] Upcoming Health Maintenance Date Due DTaP/Tdap/Td series (2 - Td) 7/23/2025 Allergies as of 10/9/2017  Review Complete On: 10/9/2017 By: Ernie Sandifer, LPN Severity Noted Reaction Type Reactions Iodinated Contrast- Oral And Iv Dye High 03/02/2015   Systemic Hives  
 abruptly got multiple hives all over body Current Immunizations  Reviewed on 6/9/2016 Name Date Influenza Vaccine 9/9/2015 Tdap 7/23/2015 ZZZ-RETIRED (DO NOT USE) Pneumococcal Vaccine (Unspecified Type) 4/4/2012  2:25 PM  
  
 Not reviewed this visit You Were Diagnosed With   
  
 Codes Comments Anxiety and depression    -  Primary ICD-10-CM: F41.8 ICD-9-CM: 300.00, 311 Night sweats     ICD-10-CM: R61 
ICD-9-CM: 780.8 Vitals BP Pulse Temp Resp Height(growth percentile) Weight(growth percentile) 118/74 (BP 1 Location: Left arm, BP Patient Position: Sitting) 62 98 °F (36.7 °C) (Oral) 18 5' 11\" (1.803 m) 186 lb (84.4 kg) SpO2 BMI Smoking Status 98% 25.94 kg/m2 Former Smoker BMI and BSA Data Body Mass Index Body Surface Area  
 25.94 kg/m 2 2.06 m 2 Preferred Pharmacy Pharmacy Name Phone [address] [phone] Your Updated Medication List  
  
   
This list is accurate as of: 10/9/17 10:59 AM.  Always use your most recent med list.  
  
  
  
  
 albuterol 90 mcg/actuation inhaler Commonly known as:  PROVENTIL HFA, VENTOLIN HFA, PROAIR HFA Take 2 Puffs by inhalation every four (4) hours as needed for Wheezing. fluticasone-salmeterol 100-50 mcg/dose diskus inhaler Commonly known as:  ADVAIR Take 1 Puff by inhalation every twelve (12) hours. guaiFENesin  mg ER tablet Commonly known as:  Florentino [employer] Florentino Take 600 mg by mouth daily. hydrOXYzine HCl 50 mg tablet Commonly known as:  ATARAX Take 1 Tab by mouth three (3) times daily as needed for Anxiety. Avoid use with other antihistamines  
  
 ondansetron hcl 8 mg tablet Commonly known as:  ZOFRAN (AS HYDROCHLORIDE) Take 1 Tab by mouth every eight (8) hours as needed for Nausea. promethazine 25 mg tablet Commonly known as:  PHENERGAN Take 1 Tab by mouth every six (6) hours as needed. PROTONIX 40 mg tablet Generic drug:  pantoprazole Take 40 mg by mouth daily. sertraline 50 mg tablet Commonly known as:  ZOLOFT Take 2 Tabs by mouth daily. Decrease per instructions of provider. Indications: Generalized Anxiety Disorder Prescriptions Sent to Pharmacy Refills  
 sertraline (ZOLOFT) 50 mg tablet 1 Sig: Take 2 Tabs by mouth daily. Decrease per instructions of provider. Indications: Generalized Anxiety Disorder Class: Normal  
 Pharmacy: [address], [facility] Ph #: [phone] Route: Oral  
 hydrOXYzine HCl (ATARAX) 50 mg tablet 0 Sig: Take 1 Tab by mouth three (3) times daily as needed for Anxiety. Avoid use with other antihistamines Class: Normal  
 Pharmacy: [facility] [address] Ph #: [phone] Route: Oral  
  
We Performed the Following QUANTIFERON TB GOLD [[id number] Custom] Patient Instructions TODAY, please go to: CHECK OUT  
 LAB Please schedule the following appointments at [address] [place] [address]: 
· Complete Physical Exam and lab follow up with Dr. Myrtle Cosme in April 2018 · Lab visit, fasting the week before our visit. Today's Plan: 
 
Week 1 and 2: 1 1/2 tablets daily (75mg) Week 3 and 4: 1 tab daily (50mg) Week 5 and 6: 1/2 tab daily (25mg) Week 7 and 8: 1/2 tab EVERY OTHER DAY (25mg) Then discontinue Introducing [facility] HE[facility]! Dear Carmelo German: Thank you for requesting a "[employer] account. Our records indicate that you already have an active "[employer] account. You can access your account anytime at [email or web address] Did you know that you can access your hospital and ER discharge instructions at any time in "[employer]? You can also review all of your test results from your hospital stay or ER visit. Additional Information If you have questions, please visit the Frequently Asked Questions section of the "[employer] website at [email or web address] Remember, "[employer] is NOT to be used for urgent needs. For medical emergencies, dial 911. Now available from your iPhone and Android! Please provide this summary of care documentation to your next provider. Your primary care clinician is listed as Chico Wills. If you have any questions after today's visit, please call [phone].

## 2017-10-09 NOTE — PROGRESS NOTES
Chief Complaint   Patient presents with    Medication Refill    Medication Problem     wants to lower dose of Zoloft, wants to start to get winged off      1. Have you been to the ER, urgent care clinic since your last visit? Hospitalized since your last visit? No     2. Have you seen or consulted any other health care providers outside of the [address] [place] [address] since your last visit? Include any pap smears or colon screening. No     The patient was counseled on the dangers of tobacco use, and was advised never to start again . Reviewed strategies to maximize success, including never to start again. Health Maintenance Due   Topic Date Due    INFLUENZA AGE 9 TO ADULT Discussed with patient today and advised to follow up. Patient declines. 08/01/2017      ACP is not on file, advised to return.

## 2017-10-11 LAB
ANNOTATION COMMENT IMP: NORMAL
GAMMA INTERFERON BACKGROUND BLD IA-ACNC: 0.02 IU/ML
M TB IFN-G BLD-IMP: NEGATIVE
M TB IFN-G CD4+ BCKGRND COR BLD-ACNC: 0.01 IU/ML
M TB IFN-G CD4+ T-CELLS BLD-ACNC: 0.03 IU/ML
MITOGEN IGNF BLD-ACNC: 6.07 IU/ML
QUANTIFERON INCUBATION: NORMAL
SERVICE CMNT-IMP: NORMAL

## 2017-10-16 ENCOUNTER — OFFICE VISIT (OUTPATIENT)
Dept: SURGERY | Age: 37
End: 2017-10-16

## 2017-10-16 VITALS
WEIGHT: 190 LBS | OXYGEN SATURATION: 98 % | TEMPERATURE: 98.4 F | HEART RATE: 82 BPM | RESPIRATION RATE: 16 BRPM | SYSTOLIC BLOOD PRESSURE: 126 MMHG | DIASTOLIC BLOOD PRESSURE: 76 MMHG | BODY MASS INDEX: 26.6 KG/M2 | HEIGHT: 71 IN

## 2017-10-16 DIAGNOSIS — M79.642 LEFT HAND PAIN: Primary | ICD-10-CM

## 2017-10-16 NOTE — PROGRESS NOTES
HISTORY OF PRESENT ILLNESS  Karolyn Booth is a 42 y.o. male who is self referred for further evaluation of left hand pain and a possible foreign body. HPI Comments: Mr. Deedee Evans tells me that he was lifting wooden pallets at work several weeks ago when he got a splinter in the palm of his left hand. He believes that there is still part of the splinter in his hand. Denies redness or swelling of his left hand. No associated drainage. Minimal discomfort in left hand. He has otherwise been in his usual state of health. Past Medical History:  No date: Asthma  7/9/2015: Cellulitis and abscess of leg  2012: Chronic obstructive pulmonary disease ([facility] [address] [facility]      Comment: had collapsed lung and pna  7/9/2015: Generalized anxiety disorder  7/9/2015: Hypokalemia  10/16/2017: Left hand pain  7/9/2015: Obesity (BMI 30-39.9)  4/2/2012: Pneumomediastinum ([facility] [address] [facility]  07/09/2015: Prediabetes      Comment: 6.0  09/10/2015: Vomiting      Comment:  Kermit Chris-EGD&gstric emptying planned-                seeing Dr. Shanta Curtis- next Feb 2017    History reviewed. No pertinent surgical history. Review of patient's family history indicates:    Diabetes                       Father                    Diabetes                       Maternal Uncle            Cancer                         Maternal Uncle            Cancer                         Paternal Uncle            Cancer                         Mother                      Comment: Uterine    Cancer                         Maternal Grandfather        Comment: leukemia    Diabetes                       Maternal Uncle            Kidney Disease                 Maternal Uncle              Comment: on dialysis    Social History: Employment - [employer]. Tobacco - Denies. EtOH - Denies. Review of systems negative except as noted. Review of Systems   Constitutional: Negative for chills and fever. Musculoskeletal:        Left hand discomfort.        Physical Exam   Constitutional: He appears well-developed and well-nourished. No distress. HENT:   Head: Normocephalic and atraumatic. Eyes: No scleral icterus. Neck: Neck supple. Cardiovascular: Normal rate and regular rhythm. Pulmonary/Chest: Effort normal and breath sounds normal.   Abdominal: Soft. He exhibits no distension. There is no tenderness. There is no rebound and no guarding. Musculoskeletal: Normal range of motion. Lymphadenopathy:     He has no cervical adenopathy. Neurological: He is alert. Skin:        Small puncture wound. No cellulitis, induration or drainage. A palpable foreign body is not appreciated. Vitals reviewed. ASSESSMENT and PLAN  Reassured Mr. Robin Tran that at this point in time, there is no evidence of active infection in his left hand. No indication for abx therapy. Will check x-rays of the left hand to look for a radio-opaque foreign body and will see him following that to review findings with him. He is agreeable to this plan and is most certainly free to contact the office should any questions or concerns arise. CC: Dean Hodgkins Ruthine Hailstone, MD

## 2017-10-16 NOTE — MR AVS SNAPSHOT
Visit Information Date & Time Provider Department Dept. Phone Encounter #  
 10/16/2017 11:40 AM Aaron Andersen MD [phone] [id number] Your Appointments 10/16/2017 11:40 AM  
New Patient with Aaron Andersen MD  
[address] ([address]) Appt Note: NP  Referred by friend for evaluation large splinter, palm of hand. vca  
 [address] [facility] [address]  
[facility] [phone]  
  
    
 4/6/2018  8:00 AM  
LAB with [facility] [address] ([facility]E [address]) Appt Note: Dr. Johnson Stacks Fasting [address] [facility] [address]  
[phone]  
  
   
 [address] 4/13/2018  8:00 AM  
COMPLETE 40 with [address] ([address]) Appt Note: CPE, & Results [address] [facility] [address]  
[phone]  
  
   
 [address] [place] [address] Upcoming Health Maintenance Date Due DTaP/Tdap/Td series (2 - Td) 7/23/2025 Allergies as of 10/16/2017  Review Complete On: 10/16/2017 By: Barrie Galvan LPN Severity Noted Reaction Type Reactions Iodinated Contrast- Oral And Iv Dye High 03/02/2015   Systemic Hives  
 abruptly got multiple hives all over body Current Immunizations  Reviewed on 6/9/2016 Name Date Influenza Vaccine 9/9/2015 Tdap 7/23/2015 ZZZ-RETIRED (DO NOT USE) Pneumococcal Vaccine (Unspecified Type) 4/4/2012  2:25 PM  
  
 Not reviewed this visit Vitals BP Pulse Temp Resp Height(growth percentile) Weight(growth percentile) 126/76 (BP 1 Location: Left arm, BP Patient Position: Sitting) 82 98.4 °F (36.9 °C) (Oral) 16 5' 11\" (1.803 m) 190 lb (86.2 kg) SpO2 BMI Smoking Status 98% 26.5 kg/m2 Former Smoker BMI and BSA Data Body Mass Index Body Surface Area  
 26.5 kg/m 2 2.08 m 2 Preferred Pharmacy Pharmacy Name Phone [address] [place] [phone] Your Updated Medication List  
  
   
This list is accurate as of: 10/16/17 11:39 AM.  Always use your most recent med list.  
  
  
  
  
 albuterol 90 mcg/actuation inhaler Commonly known as:  PROVENTIL HFA, VENTOLIN HFA, PROAIR HFA Take 2 Puffs by inhalation every four (4) hours as needed for Wheezing. fluticasone-salmeterol 100-50 mcg/dose diskus inhaler Commonly known as:  ADVAIR Take 1 Puff by inhalation every twelve (12) hours. guaiFENesin  mg ER tablet Commonly known as:  [address] Take 600 mg by mouth daily. hydrOXYzine HCl 50 mg tablet Commonly known as:  ATARAX Take 1 Tab by mouth three (3) times daily as needed for Anxiety. Avoid use with other antihistamines  
  
 ondansetron hcl 8 mg tablet Commonly known as:  ZOFRAN (AS HYDROCHLORIDE) Take 1 Tab by mouth every eight (8) hours as needed for Nausea. promethazine 25 mg tablet Commonly known as:  PHENERGAN Take 1 Tab by mouth every six (6) hours as needed. PROTONIX 40 mg tablet Generic drug:  pantoprazole Take 40 mg by mouth daily. sertraline 50 mg tablet Commonly known as:  ZOLOFT Take 2 Tabs by mouth daily. Decrease per instructions of provider. Indications: Generalized Anxiety Disorder Introducing [facility] & HEALTH SERVICES! Dear Duke Rendon: 
Thank you for requesting a [employer] account. Our records indicate that you already have an active [employer] account. You can access your account anytime at [email or web address] Did you know that you can access your hospital and ER discharge instructions at any time in [employer]? You can also review all of your test results from your hospital stay or ER visit. Additional Information If you have questions, please visit the Frequently Asked Questions section of the [employer] website at [email or web address] Remember, [employer] is NOT to be used for urgent needs. For medical emergencies, dial 911. Now available from your iPhone and Android! Please provide this summary of care documentation to your next provider. Your primary care clinician is listed as Sukh Hatch. If you have any questions after today's visit, please call [phone].

## 2018-01-27 ENCOUNTER — APPOINTMENT (OUTPATIENT)
Dept: CT IMAGING | Age: 38
End: 2018-01-27
Attending: PHYSICIAN ASSISTANT
Payer: COMMERCIAL

## 2018-01-27 ENCOUNTER — HOSPITAL ENCOUNTER (EMERGENCY)
Age: 38
Discharge: HOME | End: 2018-01-27
Attending: EMERGENCY MEDICINE
Payer: COMMERCIAL

## 2018-01-27 ENCOUNTER — APPOINTMENT (OUTPATIENT)
Dept: GENERAL RADIOLOGY | Age: 38
End: 2018-01-27
Attending: PHYSICIAN ASSISTANT
Payer: COMMERCIAL

## 2018-01-27 VITALS
HEIGHT: 71 IN | SYSTOLIC BLOOD PRESSURE: 137 MMHG | OXYGEN SATURATION: 98 % | BODY MASS INDEX: 24.22 KG/M2 | WEIGHT: 173 LBS | TEMPERATURE: 99.4 F | DIASTOLIC BLOOD PRESSURE: 86 MMHG | RESPIRATION RATE: 14 BRPM | HEART RATE: 79 BPM

## 2018-01-27 DIAGNOSIS — R11.2 NON-INTRACTABLE VOMITING WITH NAUSEA, UNSPECIFIED VOMITING TYPE: Primary | ICD-10-CM

## 2018-01-27 LAB
ALBUMIN SERPL-MCNC: 4.2 G/DL (ref 3.5–5)
ALBUMIN/GLOB SERPL: 1 {RATIO} (ref 1.1–2.2)
ALP SERPL-CCNC: 65 U/L (ref 45–117)
ALT SERPL-CCNC: 30 U/L (ref 12–78)
ANION GAP SERPL CALC-SCNC: 6 MMOL/L (ref 5–15)
APPEARANCE UR: CLEAR
AST SERPL-CCNC: 12 U/L (ref 15–37)
BACTERIA URNS QL MICRO: NEGATIVE /HPF
BASOPHILS # BLD: 0 K/UL (ref 0–0.1)
BASOPHILS NFR BLD: 0 % (ref 0–1)
BILIRUB SERPL-MCNC: 0.8 MG/DL (ref 0.2–1)
BILIRUB UR QL: NEGATIVE
BUN SERPL-MCNC: 17 MG/DL (ref 6–20)
BUN/CREAT SERPL: 18 (ref 12–20)
CALCIUM SERPL-MCNC: 9.6 MG/DL (ref 8.5–10.1)
CHLORIDE SERPL-SCNC: 97 MMOL/L (ref 97–108)
CO2 SERPL-SCNC: 32 MMOL/L (ref 21–32)
COLOR UR: ABNORMAL
CREAT SERPL-MCNC: 0.95 MG/DL (ref 0.7–1.3)
DIFFERENTIAL METHOD BLD: ABNORMAL
EOSINOPHIL # BLD: 0.1 K/UL (ref 0–0.4)
EOSINOPHIL NFR BLD: 1 % (ref 0–7)
EPITH CASTS URNS QL MICRO: ABNORMAL /LPF
ERYTHROCYTE [DISTWIDTH] IN BLOOD BY AUTOMATED COUNT: 12.2 % (ref 11.5–14.5)
GLOBULIN SER CALC-MCNC: 4.1 G/DL (ref 2–4)
GLUCOSE SERPL-MCNC: 107 MG/DL (ref 65–100)
GLUCOSE UR STRIP.AUTO-MCNC: NEGATIVE MG/DL
HCT VFR BLD AUTO: 45.1 % (ref 36.6–50.3)
HGB BLD-MCNC: 15.2 G/DL (ref 12.1–17)
HGB UR QL STRIP: NEGATIVE
HYALINE CASTS URNS QL MICRO: ABNORMAL /LPF (ref 0–5)
IMM GRANULOCYTES # BLD: 0.1 K/UL (ref 0–0.04)
IMM GRANULOCYTES NFR BLD AUTO: 1 % (ref 0–0.5)
KETONES UR QL STRIP.AUTO: NEGATIVE MG/DL
LEUKOCYTE ESTERASE UR QL STRIP.AUTO: NEGATIVE
LIPASE SERPL-CCNC: 120 U/L (ref 73–393)
LYMPHOCYTES # BLD: 1.9 K/UL (ref 0.8–3.5)
LYMPHOCYTES NFR BLD: 20 % (ref 12–49)
MCH RBC QN AUTO: 30.6 PG (ref 26–34)
MCHC RBC AUTO-ENTMCNC: 33.7 G/DL (ref 30–36.5)
MCV RBC AUTO: 90.7 FL (ref 80–99)
MONOCYTES # BLD: 1.2 K/UL (ref 0–1)
MONOCYTES NFR BLD: 13 % (ref 5–13)
NEUTS SEG # BLD: 6.2 K/UL (ref 1.8–8)
NEUTS SEG NFR BLD: 66 % (ref 32–75)
NITRITE UR QL STRIP.AUTO: NEGATIVE
NRBC # BLD: 0 K/UL (ref 0–0.01)
NRBC BLD-RTO: 0 PER 100 WBC
PH UR STRIP: 5 [PH] (ref 5–8)
PLATELET # BLD AUTO: 246 K/UL (ref 150–400)
PMV BLD AUTO: 10.2 FL (ref 8.9–12.9)
POTASSIUM SERPL-SCNC: 4.4 MMOL/L (ref 3.5–5.1)
PROT SERPL-MCNC: 8.3 G/DL (ref 6.4–8.2)
PROT UR STRIP-MCNC: ABNORMAL MG/DL
RBC # BLD AUTO: 4.97 M/UL (ref 4.1–5.7)
RBC #/AREA URNS HPF: ABNORMAL /HPF (ref 0–5)
SODIUM SERPL-SCNC: 135 MMOL/L (ref 136–145)
SP GR UR REFRACTOMETRY: 1.03 (ref 1–1.03)
UR CULT HOLD, URHOLD: NORMAL
UROBILINOGEN UR QL STRIP.AUTO: 0.2 EU/DL (ref 0.2–1)
WBC # BLD AUTO: 9.5 K/UL (ref 4.1–11.1)
WBC URNS QL MICRO: ABNORMAL /HPF (ref 0–4)

## 2018-01-27 PROCEDURE — 96372 THER/PROPH/DIAG INJ SC/IM: CPT

## 2018-01-27 PROCEDURE — 74176 CT ABD & PELVIS W/O CONTRAST: CPT

## 2018-01-27 PROCEDURE — 74019 RADEX ABDOMEN 2 VIEWS: CPT

## 2018-01-27 PROCEDURE — 74011000250 HC RX REV CODE- 250: Performed by: PHYSICIAN ASSISTANT

## 2018-01-27 PROCEDURE — 83690 ASSAY OF LIPASE: CPT | Performed by: EMERGENCY MEDICINE

## 2018-01-27 PROCEDURE — 81001 URINALYSIS AUTO W/SCOPE: CPT | Performed by: PHYSICIAN ASSISTANT

## 2018-01-27 PROCEDURE — 74011250636 HC RX REV CODE- 250/636: Performed by: PHYSICIAN ASSISTANT

## 2018-01-27 PROCEDURE — 96375 TX/PRO/DX INJ NEW DRUG ADDON: CPT

## 2018-01-27 PROCEDURE — 36415 COLL VENOUS BLD VENIPUNCTURE: CPT | Performed by: EMERGENCY MEDICINE

## 2018-01-27 PROCEDURE — 99283 EMERGENCY DEPT VISIT LOW MDM: CPT

## 2018-01-27 PROCEDURE — 85025 COMPLETE CBC W/AUTO DIFF WBC: CPT | Performed by: EMERGENCY MEDICINE

## 2018-01-27 PROCEDURE — 96374 THER/PROPH/DIAG INJ IV PUSH: CPT

## 2018-01-27 PROCEDURE — 96361 HYDRATE IV INFUSION ADD-ON: CPT

## 2018-01-27 PROCEDURE — 80053 COMPREHEN METABOLIC PANEL: CPT | Performed by: EMERGENCY MEDICINE

## 2018-01-27 RX ORDER — FAMOTIDINE 10 MG/ML
20 INJECTION INTRAVENOUS
Status: COMPLETED | OUTPATIENT
Start: 2018-01-27 | End: 2018-01-27

## 2018-01-27 RX ORDER — ONDANSETRON 2 MG/ML
4 INJECTION INTRAMUSCULAR; INTRAVENOUS
Status: COMPLETED | OUTPATIENT
Start: 2018-01-27 | End: 2018-01-27

## 2018-01-27 RX ORDER — DICYCLOMINE HYDROCHLORIDE 10 MG/ML
20 INJECTION INTRAMUSCULAR
Status: COMPLETED | OUTPATIENT
Start: 2018-01-27 | End: 2018-01-27

## 2018-01-27 RX ORDER — PROMETHAZINE HYDROCHLORIDE 25 MG/1
25 SUPPOSITORY RECTAL
Qty: 12 SUPPOSITORY | Refills: 0 | Status: SHIPPED | OUTPATIENT
Start: 2018-01-27 | End: 2018-02-03

## 2018-01-27 RX ORDER — METOCLOPRAMIDE HYDROCHLORIDE 5 MG/ML
10 INJECTION INTRAMUSCULAR; INTRAVENOUS
Status: COMPLETED | OUTPATIENT
Start: 2018-01-27 | End: 2018-01-27

## 2018-01-27 RX ADMIN — ONDANSETRON 4 MG: 2 INJECTION INTRAMUSCULAR; INTRAVENOUS at 12:38

## 2018-01-27 RX ADMIN — METOCLOPRAMIDE 10 MG: 5 INJECTION, SOLUTION INTRAMUSCULAR; INTRAVENOUS at 16:05

## 2018-01-27 RX ADMIN — FAMOTIDINE 20 MG: 10 INJECTION, SOLUTION INTRAVENOUS at 12:38

## 2018-01-27 RX ADMIN — SODIUM CHLORIDE 1000 ML: 900 INJECTION, SOLUTION INTRAVENOUS at 12:38

## 2018-01-27 RX ADMIN — DICYCLOMINE HYDROCHLORIDE 20 MG: 20 INJECTION, SOLUTION INTRAMUSCULAR at 12:38

## 2018-01-27 RX ADMIN — SODIUM CHLORIDE 1000 ML: 900 INJECTION, SOLUTION INTRAVENOUS at 13:24

## 2018-01-27 NOTE — ED PROVIDER NOTES
HPI Comments: 42year old male hx asthma, pneumomediastinum, anxiety, ? hx gastroparesis, pre-DM presenting for vomiting. Pt reports that he started about 4 days ago with N/V/D. Reports TNC episodes of emesis, has had \"at least 10\" today. Also with many episodes of diarrhea. Had some black stools a few days ago, none since. Significant other reports temp of 102 2 days ago. Toddler at home recently tested + for influenza. Pt denies cough or congestion. + sore throat. Girlfriend notes that they went to an urgent care today and were sent here for further eval.  Pt notes abdominal pain described as generalized cramping. No urinary symptoms, CP, SOB, or other concerns. PMHx: as above  PSx: no prior abdominal surgeries  Social: sober from alcohol for 4 years. Smokes marijuana daily. No tobacco use. Patient is a 42 y.o. male presenting with abdominal pain and vomiting. The history is provided by the patient and a friend. Abdominal Pain    Associated symptoms include diarrhea, nausea and vomiting. Pertinent negatives include no fever, no dysuria and no chest pain. Vomiting    Associated symptoms include abdominal pain and diarrhea. Pertinent negatives include no fever. Past Medical History:   Diagnosis Date    Asthma     Cellulitis and abscess of leg 7/9/2015    Chronic obstructive pulmonary disease ([address] 2012    had collapsed lung and pna    Generalized anxiety disorder 7/9/2015    Hypokalemia 7/9/2015    Left hand pain 10/16/2017    Obesity (BMI 30-39. 9) 7/9/2015    Pneumomediastinum ([address] 4/2/2012    Prediabetes 07/09/2015    6.0    Vomiting 09/10/2015     Kermit Chris-EGD&gstric emptying planned- seeing Dr. Oralia Enciso- next Feb 2017       History reviewed. No pertinent surgical history.       Family History:   Problem Relation Age of Onset    Diabetes Father     Diabetes Maternal Uncle     Cancer Maternal Uncle     Cancer Paternal Uncle     Cancer Mother      Uterine    Cancer Maternal Grandfather      leukemia    Diabetes Maternal Uncle     Kidney Disease Maternal Uncle      on dialysis       Social History     Social History    Marital status:      Spouse name: see social hx    Number of children: 3    Years of education: N/A     Occupational History   [employer]     has worked in multiple areas     Social History Main Topics    Smoking status: Former Smoker     Packs/day: 0.25     Years: 22.00     Quit date: 10/23/2013    Smokeless tobacco: Never Used    Alcohol use Yes      Comment:  started drinking heavily in 2002. struggled with alcoholism. quit in 5271. no complicated w/d.  Drug use: Yes     Special: Marijuana      Comment: daily, using daily since about 2007    Sexual activity: Yes     Partners: Female     Birth control/ protection: IUD, Surgical      Comment: two partners. Other Topics Concern    Not on file     Social History Narrative    All live together in an apartment    Silvase- g/f, 1 child- Edilma Mayes (youngest) (+2 from a prior partner)    Lachelle Izaguirre- wife, 2 children         Prosper 2012    Leonora Felton 2007    [place] Lade 2007    Ibarra Herberthn 2002         ALLERGIES: Iodinated contrast- oral and iv dye    Review of Systems   Constitutional: Negative for fever. HENT: Negative for congestion. Eyes: Negative for discharge and redness. Respiratory: Negative for shortness of breath. Cardiovascular: Negative for chest pain. Gastrointestinal: Positive for abdominal pain, diarrhea, nausea and vomiting. Genitourinary: Negative for difficulty urinating and dysuria. Musculoskeletal: Negative for neck stiffness. Skin: Negative for wound. Neurological: Negative for syncope. All other systems reviewed and are negative.       Vitals:    01/27/18 1204 01/27/18 1737   BP: (!) 139/93 137/86   Pulse: 70 79   Resp: 20 14   Temp: 99.5 °F (37.5 °C) 99.4 °F (37.4 °C)   SpO2: 100% 98%   Weight: 78.5 kg (173 lb)    Height: 5' 11\" (1.803 m)             Physical Exam   Constitutional: He is oriented to person, place, and time. He appears well-developed and well-nourished. No distress. WM, appears to not feel well, no acute distress   HENT:   Head: Normocephalic and atraumatic. Right Ear: External ear normal.   Left Ear: External ear normal.   Dry MM   Eyes: Conjunctivae are normal. No scleral icterus. Neck: Neck supple. No tracheal deviation present. Cardiovascular: Normal rate, regular rhythm and normal heart sounds. Exam reveals no gallop and no friction rub. No murmur heard. Pulmonary/Chest: Effort normal and breath sounds normal. No stridor. No respiratory distress. He has no wheezes. Abdominal: Soft. He exhibits no distension. There is no tenderness. There is no guarding. Musculoskeletal: Normal range of motion. Neurological: He is alert and oriented to person, place, and time. Skin: Skin is warm and dry. Psychiatric: He has a normal mood and affect. His behavior is normal.   Nursing note and vitals reviewed. MDM  Number of Diagnoses or Management Options  Non-intractable vomiting with nausea, unspecified vomiting type:   Diagnosis management comments: 42year old male with possible hx gastroparesis presenting for N/V/D and abdominal cramping. Low grade temp in the ED. Toddler sick at home. Benign abdominal exam.  Pt noted black stools a few days ago which have resolved, HGB 15.2. Pt with reassuring labs. Did not improve with first antiemetic and had continued pain, CT ordered which was normal.  Pt improved and tolerating fluids after reglan. Discussed with pt possible symptoms related to underlying GI condition, or possible viral illness given constellation of symptoms. Discussed use of antiemetic at home PRN and GI/PCP f/u.        Amount and/or Complexity of Data Reviewed  Clinical lab tests: ordered and reviewed  Tests in the radiology section of CPT®: ordered and reviewed  Discuss the patient with other providers: yes (Dr. Zoey Doe, ED attending)      ED Course       Procedures      Pt reassessed. Reports feeling better. States that he is thirsty. Awaiting imaging. ANDRIY Villalobos  1:44 PM     Pt vomited. Will try additional antiemetic and CT scan. ANDRIY Villalobos  3:29 PM      Pt reassessed. Feling better. Tolerating fluids. Requesting suppositories for discharge. Encouraged GI f/u.   ANDRIY Villalobos  5:33 PM

## 2018-01-27 NOTE — LETTER
[address] 
[address] [place] [id number] 
[phone] Work/School Note Date: 1/27/2018 To Whom It May concern: 
 
Katina Moreno was seen and treated today in the emergency room by the following provider(s): 
Attending Provider: Isabel Britton MD 
Physician Assistant: ANDRIY Bragg.   
 
Katina Moreno may return to work on 1/29/18. Sincerely, Gurvinder Diamond RN

## 2018-01-27 NOTE — ED TRIAGE NOTES
Triage Note: patient is coming in with abdominal pain for vomiting and diarrhea since Tuesday. + black tarry stools.

## 2018-01-27 NOTE — DISCHARGE INSTRUCTIONS
Nausea and Vomiting: Care Instructions  Your Care Instructions    When you are nauseated, you may feel weak and sweaty and notice a lot of saliva in your mouth. Nausea often leads to vomiting. Most of the time you do not need to worry about nausea and vomiting, but they can be signs of other illnesses. Two common causes of nausea and vomiting are stomach flu and food poisoning. Nausea and vomiting from viral stomach flu will usually start to improve within 24 hours. Nausea and vomiting from food poisoning may last from 12 to 48 hours. The doctor has checked you carefully, but problems can develop later. If you notice any problems or new symptoms, get medical treatment right away. Follow-up care is a key part of your treatment and safety. Be sure to make and go to all appointments, and call your doctor if you are having problems. It's also a good idea to know your test results and keep a list of the medicines you take. How can you care for yourself at home? · To prevent dehydration, drink plenty of fluids, enough so that your urine is light yellow or clear like water. Choose water and other caffeine-free clear liquids until you feel better. If you have kidney, heart, or liver disease and have to limit fluids, talk with your doctor before you increase the amount of fluids you drink. · Rest in bed until you feel better. · When you are able to eat, try clear soups, mild foods, and liquids until all symptoms are gone for 12 to 48 hours. Other good choices include dry toast, crackers, cooked cereal, and gelatin dessert, such as Jell-O. When should you call for help? Call 911 anytime you think you may need emergency care. For example, call if:  ? · You passed out (lost consciousness). ?Call your doctor now or seek immediate medical care if:  ? · You have symptoms of dehydration, such as:  ¨ Dry eyes and a dry mouth. ¨ Passing only a little dark urine.   ¨ Feeling thirstier than usual.   ? · You have new or worsening belly pain. ? · You have a new or higher fever. ? · You vomit blood or what looks like coffee grounds. ? Watch closely for changes in your health, and be sure to contact your doctor if:  ? · You have ongoing nausea and vomiting. ? · Your vomiting is getting worse. ? · Your vomiting lasts longer than 2 days. ? · You are not getting better as expected. Where can you learn more? Go to http://garcia-carlos.info/. Enter 25 [id number] in the search box to learn more about \"Nausea and Vomiting: Care Instructions. \"  Current as of: March 20, 2017  Content Version: 11.4  © 2983-1236 [employer]. Care instructions adapted under license by [employer] (which disclaims liability or warranty for this information). If you have questions about a medical condition or this instruction, always ask your healthcare professional. [address] any warranty or liability for your use of this information.

## 2018-03-27 DIAGNOSIS — F41.9 ANXIETY AND DEPRESSION: ICD-10-CM

## 2018-03-27 DIAGNOSIS — F32.A ANXIETY AND DEPRESSION: ICD-10-CM

## 2018-03-27 NOTE — TELEPHONE ENCOUNTER
PCP: Perlita Lind. Jeanette Veliz MD    Last appt: 10/9/2017  Future Appointments  Date Time Provider Fawn Romeroi   4/6/2018 8:00 AM LAB [facility] SCHED   4/13/2018 8:00 AM Perlita Lind. Jeanette Veliz MD [facility] SCHED       Requested Prescriptions     Pending Prescriptions Disp Refills    sertraline (ZOLOFT) 50 mg tablet 60 Tab 1     Sig: Take 2 Tabs by mouth daily. Decrease per instructions of provider.   Indications: Generalized Anxiety Disorder     Lab Results   Component Value Date/Time    Sodium 135 (L) 01/27/2018 12:11 PM    Potassium 4.4 01/27/2018 12:11 PM    Chloride 97 01/27/2018 12:11 PM    CO2 32 01/27/2018 12:11 PM    Anion gap 6 01/27/2018 12:11 PM    Glucose 107 (H) 01/27/2018 12:11 PM    BUN 17 01/27/2018 12:11 PM    Creatinine 0.95 01/27/2018 12:11 PM    BUN/Creatinine ratio 18 01/27/2018 12:11 PM    GFR est AA >60 01/27/2018 12:11 PM    GFR est non-AA >60 01/27/2018 12:11 PM    Calcium 9.6 01/27/2018 12:11 PM     Lab Results   Component Value Date/Time    Hemoglobin A1c 5.5 01/26/2017 02:40 PM     Lab Results   Component Value Date/Time    Cholesterol, total 161 09/07/2016 10:49 AM    HDL Cholesterol 36 (L) 09/07/2016 10:49 AM    LDL, calculated 109 (H) 09/07/2016 10:49 AM    VLDL, calculated 16 09/07/2016 10:49 AM    Triglyceride 78 09/07/2016 10:49 AM     Lab Results   Component Value Date/Time    TSH 1.670 01/26/2017 02:40 PM     \

## 2018-03-30 RX ORDER — SERTRALINE HYDROCHLORIDE 50 MG/1
100 TABLET, FILM COATED ORAL DAILY
Qty: 60 TAB | Refills: 5 | Status: SHIPPED | OUTPATIENT
Start: 2018-03-30 | End: 2018-04-02 | Stop reason: SDUPTHER

## 2018-04-02 DIAGNOSIS — F32.A ANXIETY AND DEPRESSION: ICD-10-CM

## 2018-04-02 DIAGNOSIS — F41.9 ANXIETY AND DEPRESSION: ICD-10-CM

## 2018-04-02 NOTE — TELEPHONE ENCOUNTER
----- Message from Bernardino Smith sent at 4/2/2018  3:48 PM EDT -----  Regarding: Dr. Brayden Burleson  Pt is requesting a refill for \"Zoloft\" to be sent to the [facility] on file. Best contact: [phone].

## 2018-04-02 NOTE — TELEPHONE ENCOUNTER
PCP: Mauro Ramos. Gemini Wright MD    Last appt: 10/9/2017  Future Appointments  Date Time Provider Fawn Kalie   4/6/2018 8:00 AM LAB [facility] SCHED   4/13/2018 8:00 AM Mauro Ramos. Gemini Wright MD [facility] SCHED       Requested Prescriptions     Pending Prescriptions Disp Refills    sertraline (ZOLOFT) 50 mg tablet 60 Tab 5     Sig: Take 2 Tabs by mouth daily. Decrease per instructions of provider.   Indications: Generalized Anxiety Disorder     Lab Results   Component Value Date/Time    Sodium 135 (L) 01/27/2018 12:11 PM    Potassium 4.4 01/27/2018 12:11 PM    Chloride 97 01/27/2018 12:11 PM    CO2 32 01/27/2018 12:11 PM    Anion gap 6 01/27/2018 12:11 PM    Glucose 107 (H) 01/27/2018 12:11 PM    BUN 17 01/27/2018 12:11 PM    Creatinine 0.95 01/27/2018 12:11 PM    BUN/Creatinine ratio 18 01/27/2018 12:11 PM    GFR est AA >60 01/27/2018 12:11 PM    GFR est non-AA >60 01/27/2018 12:11 PM    Calcium 9.6 01/27/2018 12:11 PM     Lab Results   Component Value Date/Time    Hemoglobin A1c 5.5 01/26/2017 02:40 PM     Lab Results   Component Value Date/Time    Cholesterol, total 161 09/07/2016 10:49 AM    HDL Cholesterol 36 (L) 09/07/2016 10:49 AM    LDL, calculated 109 (H) 09/07/2016 10:49 AM    VLDL, calculated 16 09/07/2016 10:49 AM    Triglyceride 78 09/07/2016 10:49 AM     Lab Results   Component Value Date/Time    TSH 1.670 01/26/2017 02:40 PM

## 2018-04-04 RX ORDER — SERTRALINE HYDROCHLORIDE 50 MG/1
100 TABLET, FILM COATED ORAL DAILY
Qty: 60 TAB | Refills: 3 | Status: SHIPPED | OUTPATIENT
Start: 2018-04-04 | End: 2020-01-30 | Stop reason: SDUPTHER

## 2018-05-21 ENCOUNTER — TELEPHONE (OUTPATIENT)
Dept: FAMILY MEDICINE CLINIC | Age: 38
End: 2018-05-21

## 2018-05-21 DIAGNOSIS — F32.A ANXIETY AND DEPRESSION: ICD-10-CM

## 2018-05-21 DIAGNOSIS — F41.9 ANXIETY AND DEPRESSION: ICD-10-CM

## 2018-05-21 NOTE — TELEPHONE ENCOUNTER
----- Message from Heydi Best sent at 5/21/2018  3:25 PM EDT -----  Regarding: Dr. Evens Gonzalez  Pt rquested a refill for \"Zoloft 50 mg\" and uses the [facility] on file,[phone]. Pt best contact [phone].

## 2018-05-22 ENCOUNTER — TELEPHONE (OUTPATIENT)
Dept: FAMILY MEDICINE CLINIC | Age: 38
End: 2018-05-22

## 2018-05-22 NOTE — TELEPHONE ENCOUNTER
Writer called mobile number to speak with pt, as requested, regarding refill for sertraline. Writer spoke with girlfriend, Severa Bulla. Writer verified girlfriend on PHI form. Girlfriend stated that she was the one who called in request, not pt. Writer verbalized understanding. Writer notified Ms. Carter that pt had enough refills on sertraline to last him until July, and that when he went to  rx, to bring in a new card, due to one in pharmacy file has . Girlfriend verbalized understanding and appreciation.

## 2018-05-22 NOTE — TELEPHONE ENCOUNTER
----- Message from Renny Segundo sent at 5/22/2018 10:02 AM EDT -----  Regarding: Dr. Conner Davis  The patient is requesting that a refill for Rx Zoloft 50mg is called into the pharmacy.  ([facility])[phone] (A)[phone]

## 2018-05-22 NOTE — TELEPHONE ENCOUNTER
Writer called mobile number to speak with pt, as requested, regarding refill for sertraline. Writer spoke with girlfriend, Malissa Sorenson. Writer verified girlfriend on PHI form. Girlfriend stated that she was the one who called in request, not pt. Writer verbalized understanding. Writer notified Ms. Carter that pt had enough refills on sertraline to last him until July, and that when he went to  rx, to bring in a new card, due to one in pharmacy file has . Girlfriend verbalized understanding and appreciation.

## 2018-05-22 NOTE — TELEPHONE ENCOUNTER
Writer called pharmacy regarding refill request. Pt had had 3 refills and should have enough to last him until . Pt's name and  verified with pharmacist. Per pharmacist pt had refills available, stated that he will need to bring in a new insurance card, their system is stating that his current card has . Writer verbalized understanding and appreciation.

## 2018-09-16 DIAGNOSIS — F41.9 ANXIETY AND DEPRESSION: ICD-10-CM

## 2018-09-16 DIAGNOSIS — F32.A ANXIETY AND DEPRESSION: ICD-10-CM

## 2018-09-18 RX ORDER — HYDROXYZINE 50 MG/1
TABLET, FILM COATED ORAL
Qty: 90 TAB | Refills: 2 | Status: SHIPPED | OUTPATIENT
Start: 2018-09-18 | End: 2019-07-18

## 2018-09-19 NOTE — TELEPHONE ENCOUNTER
PCP: Jennifer Bowles. Ingris Edwards MD    Last appt: 10/9/2017  No future appointments.     Requested Prescriptions     Pending Prescriptions Disp Refills    hydrOXYzine HCl (ATARAX) 50 mg tablet [Pharmacy Med Name: hydrOXYzine HCL 50 MG TABLET] 90 Tab 0     Sig: TAKE ONE TABLET BY MOUTH THREE TIMES A DAY AS NEEDED FOR ANXIETY (AVOID USE WITH OTHER ANTIHISTAMINES)       Prior labs and Blood pressures:  BP Readings from Last 3 Encounters:   01/27/18 137/86   10/16/17 126/76   10/09/17 118/74     Lab Results   Component Value Date/Time    Sodium 135 (L) 01/27/2018 12:11 PM    Potassium 4.4 01/27/2018 12:11 PM    Chloride 97 01/27/2018 12:11 PM    CO2 32 01/27/2018 12:11 PM    Anion gap 6 01/27/2018 12:11 PM    Glucose 107 (H) 01/27/2018 12:11 PM    BUN 17 01/27/2018 12:11 PM    Creatinine 0.95 01/27/2018 12:11 PM    BUN/Creatinine ratio 18 01/27/2018 12:11 PM    GFR est AA >60 01/27/2018 12:11 PM    GFR est non-AA >60 01/27/2018 12:11 PM    Calcium 9.6 01/27/2018 12:11 PM     Lab Results   Component Value Date/Time    Hemoglobin A1c 5.5 01/26/2017 02:40 PM     Lab Results   Component Value Date/Time    Cholesterol, total 161 09/07/2016 10:49 AM    HDL Cholesterol 36 (L) 09/07/2016 10:49 AM    LDL, calculated 109 (H) 09/07/2016 10:49 AM    VLDL, calculated 16 09/07/2016 10:49 AM    Triglyceride 78 09/07/2016 10:49 AM     Lab Results   Component Value Date/Time    VITAMIN D, 25-HYDROXY 39.5 09/07/2016 10:49 AM       Lab Results   Component Value Date/Time    TSH 1.670 01/26/2017 02:40 PM

## 2019-07-18 ENCOUNTER — OFFICE VISIT (OUTPATIENT)
Dept: URGENT CARE | Age: 39
End: 2019-07-18

## 2019-07-18 VITALS
DIASTOLIC BLOOD PRESSURE: 77 MMHG | HEIGHT: 71 IN | TEMPERATURE: 98 F | HEART RATE: 82 BPM | OXYGEN SATURATION: 97 % | RESPIRATION RATE: 18 BRPM | WEIGHT: 222 LBS | BODY MASS INDEX: 31.08 KG/M2 | SYSTOLIC BLOOD PRESSURE: 133 MMHG

## 2019-07-18 DIAGNOSIS — S20.212A CONTUSION OF RIB ON LEFT SIDE, INITIAL ENCOUNTER: Primary | ICD-10-CM

## 2019-07-18 NOTE — PATIENT INSTRUCTIONS
See PCP if not improved over next 2 weeks        XR Results (most recent):  Results from Appointment encounter on 07/18/19   XR RIBS LT W PA CXR MIN 3 V    Narrative INDICATION:  fall     EXAM: Examination: Frontal view of the chest and of the bilateral ribs. Comparison 4/1/2012    FINDINGS: The cardiomediastinal silhouette is normal. The pulmonary vasculature  is not engorged. There is no pneumothorax, focal parenchymal opacity or  effusion. No displaced rib fractures. Impression IMPRESSION:  1. No acute abnormality            Bruised Rib: Care Instructions  Overview    You can get a bruised rib if you fall or get hit, such as in an accident or while playing sports. The medical term for a bruise is \"contusion. \" Small blood vessels get torn and leak blood under the skin. Most people think of a bruise as a black-and-blue area. But bones and muscles can also get bruised. An injury may damage the rib but not cause a bruise that you can see. Sometimes it can be hard to tell if a rib is bruised or broken. The symptoms may be the same. And a broken bone can't always be seen on an X-ray. But the treatment for a bruised rib is often the same as treatment for a broken one. An injury to the ribs can cause pain. The pain may be worse when you breathe deeply, cough, or sneeze. In most cases, a bruised rib will heal on its own. You can take pain medicine while the rib mends. Pain relief allows you to take deep breaths. Follow-up care is a key part of your treatment and safety. Be sure to make and go to all appointments, and call your doctor if you are having problems. It's also a good idea to know your test results and keep a list of the medicines you take. How can you care for yourself at home? · Rest and protect the injured or sore area. Stop, change, or take a break from any activity that causes pain. · Put ice or a cold pack on the area for 10 to 20 minutes at a time.  Put a thin cloth between the ice and your skin. · After 2 or 3 days, if your swelling is gone, put a heating pad set on low or a warm cloth on your chest. Some doctors suggest that you go back and forth between hot and cold. Put a thin cloth between the heating pad and your skin. · Ask your doctor if you can take an over-the-counter pain medicine, such as acetaminophen (Tylenol), ibuprofen (Advil, Motrin), or naproxen (Aleve). Be safe with medicines. Read and follow all instructions on the label. · As your pain gets better, slowly return to your normal activities. Be patient. Rib bruises can take weeks or months to heal. If the pain gets worse, it may be a sign that you need to rest a while longer. When should you call for help? [id number] anytime you think you may need emergency care. For example, call if:    · You have severe trouble breathing.     Call your doctor now or seek immediate medical care if:    · You have trouble breathing.     · You have a fever.     · You have a new or worse cough.     · You have new or worse pain.    Watch closely for changes in your health, and be sure to contact your doctor if:    · You do not get better as expected. Where can you learn more? Go to http://garcia-carlos.info/. Enter R125 in the search box to learn more about \"Bruised Rib: Care Instructions. \"  Current as of: September 23, 2018  Content Version: 11.9  © 0447-0624 [employer]. Care instructions adapted under license by [employer] (which disclaims liability or warranty for this information). If you have questions about a medical condition or this instruction, always ask your healthcare professional. [place] [address] any warranty or liability for your use of this information.

## 2019-07-18 NOTE — PROGRESS NOTES
Leaned over car rest yesterday and hit his left ribs of upper left chest on chair  Did not fall. Pain achy worse with pressing on area. Not elicited with breathing and denies SOB. Pain 0/10 at rest. 8 to 9 out of 10 with movement or pressing on it. Denies weakness, numbness or tingling  hasnt tried any medications. Hx of COPD, pneumomediastinum, anxiety and depression            Past Medical History:   Diagnosis Date    Asthma     Cellulitis and abscess of leg 2015    Chronic obstructive pulmonary disease ([facility]     had collapsed lung and pna    Generalized anxiety disorder 2015    Hypokalemia 2015    Left hand pain 10/16/2017    Obesity (BMI 30-39. 9) 2015    Pneumomediastinum ([facility] 2012    Prediabetes 2015    6.0    Vomiting 09/10/2015     Kermit Chirs-EGD&gstric emptying planned- seeing Dr. Pham Tom- next 2017        History reviewed. No pertinent surgical history.       Family History   Problem Relation Age of Onset    Diabetes Father     Diabetes Maternal Uncle     Cancer Maternal Uncle     Cancer Paternal Uncle     Cancer Mother         Uterine    Cancer Maternal Grandfather         leukemia    Diabetes Maternal Uncle     Kidney Disease Maternal Uncle         on dialysis        Social History     Socioeconomic History    Marital status:      Spouse name: see social hx    Number of children: 3    Years of education: Not on file    Highest education level: Not on file   Occupational History     Employer: [employer]: has worked in multiple areas   Social Needs    Financial resource strain: Not on file    Food insecurity:     Worry: Not on file     Inability: Not on file   [employer] needs:     Medical: Not on file     Non-medical: Not on file   Tobacco Use    Smoking status: Former Smoker     Packs/day: 0.25     Years: 22.00     Pack years: 5.50     Last attempt to quit: 10/23/2013     Years since quittin.7    Smokeless tobacco: Never Used   Substance and Sexual Activity    Alcohol use: Yes     Comment:  started drinking heavily in 2002. struggled with alcoholism. quit in 7030. no complicated w/d.  Drug use: Yes     Types: Marijuana     Comment: daily, using daily since about 2007    Sexual activity: Yes     Partners: Female     Birth control/protection: IUD, Surgical     Comment: two partners. Lifestyle    Physical activity:     Days per week: Not on file     Minutes per session: Not on file    Stress: Not on file   Relationships    Social connections:     Talks on phone: Not on file     Gets together: Not on file     Attends Pent[employer]ostal service: Not on file     Active member of club or organization: Not on file     Attends meetings of clubs or organizations: Not on file     Relationship status: Not on file    Intimate partner violence:     Fear of current or ex partner: Not on file     Emotionally abused: Not on file     Physically abused: Not on file     Forced sexual activity: Not on file   Other Topics Concern    Not on file   Social History Narrative    All live together in an apartment    [place]- g/f, 1 child- Megan Vega (youngest) (+2 from a prior partner)    Vincent Montgomery- wife, 2 children         Prosper 2012    Gae Reading 2007    Jeanell Eisenmenger 2007    Junior Luz 2002                ALLERGIES: Iodinated contrast- oral and iv dye    Review of Systems   Respiratory: Negative for cough, shortness of breath and wheezing. Neurological: Negative for dizziness. All other systems reviewed and are negative. Vitals:    07/18/19 1638   BP: 133/77   Pulse: 82   Resp: 18   Temp: 98 °F (36.7 °C)   SpO2: 97%   Weight: 222 lb (100.7 kg)   Height: 5' 11\" (1.803 m)       Physical Exam   Constitutional: He is oriented to person, place, and time. No distress. HENT:   Head: Normocephalic. Mouth/Throat: No oropharyngeal exudate. Eyes: Pupils are equal, round, and reactive to light.  Conjunctivae and EOM are normal.   Cardiovascular: Normal rate and normal heart sounds. Exam reveals no friction rub. No murmur heard. Pulmonary/Chest: Effort normal and breath sounds normal. No respiratory distress. He has no wheezes. He has no rales. He exhibits no tenderness. Neurological: He is alert and oriented to person, place, and time. Skin: Skin is warm and dry. Psychiatric: He has a normal mood and affect. His behavior is normal. Thought content normal.       MDM     Differential Diagnosis; Clinical Impression; Plan:       CLINICAL IMPRESSION:  (S20.400A) Contusion of rib on left side, initial encounter  (primary encounter diagnosis)      Plan:  See below radiology report  OTC naproxen  Warm or cool compresses  Follow up with PCP within next 1-2 weeks if not improving  ED for SOB or respiratory issues or for new, worsening or changes         We have reviewed concerning signs/symptoms, normal vs abnormal progression of medical condition and when to seek immediate medical attention. Schedule with PCP or Urgent Care immediately for worsening or new symptoms. You should see your PCP for updates on your routine health maintenance. Procedures             XR Results (most recent):  Results from Appointment encounter on 07/18/19   XR RIBS LT W PA CXR MIN 3 V    Narrative INDICATION:  fall     EXAM: Examination: Frontal view of the chest and of the bilateral ribs. Comparison 4/1/2012    FINDINGS: The cardiomediastinal silhouette is normal. The pulmonary vasculature  is not engorged. There is no pneumothorax, focal parenchymal opacity or  effusion. No displaced rib fractures. Impression IMPRESSION:  1.  No acute abnormality

## 2019-07-18 NOTE — LETTER
[address] 
[place] [address] 
[phone] Work/School Note Date: 7/18/2019 To Whom It May concern: 
 
Duane Ferron was seen and treated today in the urgent care center by the following KAYLA Christianson Duane Ferron may return to work on 7/21/19 Sincerely, Carlos Goyal RN

## 2019-11-18 DIAGNOSIS — F41.9 ANXIETY AND DEPRESSION: ICD-10-CM

## 2019-11-18 DIAGNOSIS — F32.A ANXIETY AND DEPRESSION: ICD-10-CM

## 2019-11-19 NOTE — TELEPHONE ENCOUNTER
PCP: Valarie Tidwell MD    Last appt: Visit date not found  No future appointments. Requested Prescriptions     Pending Prescriptions Disp Refills    sertraline (ZOLOFT) 50 mg tablet 60 Tab 3     Sig: Take 2 Tabs by mouth daily. Decrease per instructions of provider.   Indications: Repeated Episodes of Anxiety       Prior labs and Blood pressures:  BP Readings from Last 3 Encounters:   07/18/19 133/77   01/27/18 137/86   10/16/17 126/76     Lab Results   Component Value Date/Time    Sodium 135 (L) 01/27/2018 12:11 PM    Potassium 4.4 01/27/2018 12:11 PM    Chloride 97 01/27/2018 12:11 PM    CO2 32 01/27/2018 12:11 PM    Anion gap 6 01/27/2018 12:11 PM    Glucose 107 (H) 01/27/2018 12:11 PM    BUN 17 01/27/2018 12:11 PM    Creatinine 0.95 01/27/2018 12:11 PM    BUN/Creatinine ratio 18 01/27/2018 12:11 PM    GFR est AA >60 01/27/2018 12:11 PM    GFR est non-AA >60 01/27/2018 12:11 PM    Calcium 9.6 01/27/2018 12:11 PM     Lab Results   Component Value Date/Time    Hemoglobin A1c 5.5 01/26/2017 02:40 PM     Lab Results   Component Value Date/Time    Cholesterol, total 161 09/07/2016 10:49 AM    HDL Cholesterol 36 (L) 09/07/2016 10:49 AM    LDL, calculated 109 (H) 09/07/2016 10:49 AM    VLDL, calculated 16 09/07/2016 10:49 AM    Triglyceride 78 09/07/2016 10:49 AM     Lab Results   Component Value Date/Time    VITAMIN D, 25-HYDROXY 39.5 09/07/2016 10:49 AM       Lab Results   Component Value Date/Time    TSH 1.670 01/26/2017 02:40 PM

## 2019-11-22 RX ORDER — SERTRALINE HYDROCHLORIDE 50 MG/1
100 TABLET, FILM COATED ORAL DAILY
Qty: 60 TAB | Refills: 3 | OUTPATIENT
Start: 2019-11-22

## 2020-01-30 ENCOUNTER — OFFICE VISIT (OUTPATIENT)
Dept: FAMILY MEDICINE CLINIC | Age: 40
End: 2020-01-30

## 2020-01-30 VITALS
TEMPERATURE: 99 F | SYSTOLIC BLOOD PRESSURE: 142 MMHG | WEIGHT: 245 LBS | OXYGEN SATURATION: 98 % | HEIGHT: 71 IN | RESPIRATION RATE: 16 BRPM | HEART RATE: 73 BPM | DIASTOLIC BLOOD PRESSURE: 99 MMHG | BODY MASS INDEX: 34.3 KG/M2

## 2020-01-30 DIAGNOSIS — F41.9 ANXIETY AND DEPRESSION: Primary | ICD-10-CM

## 2020-01-30 DIAGNOSIS — K21.9 GASTROESOPHAGEAL REFLUX DISEASE WITHOUT ESOPHAGITIS: ICD-10-CM

## 2020-01-30 DIAGNOSIS — K31.84 GASTROPARESIS: ICD-10-CM

## 2020-01-30 DIAGNOSIS — F32.A ANXIETY AND DEPRESSION: Primary | ICD-10-CM

## 2020-01-30 RX ORDER — SERTRALINE HYDROCHLORIDE 100 MG/1
100 TABLET, FILM COATED ORAL DAILY
Qty: 30 TAB | Refills: 5 | Status: SHIPPED | OUTPATIENT
Start: 2020-01-30 | End: 2020-06-25 | Stop reason: SDUPTHER

## 2020-01-30 RX ORDER — BUPROPION HYDROCHLORIDE 150 MG/1
150 TABLET ORAL
Qty: 30 TAB | Refills: 3 | Status: SHIPPED | OUTPATIENT
Start: 2020-01-30 | End: 2020-06-08

## 2020-01-30 RX ORDER — PHENOL/SODIUM PHENOLATE
20 AEROSOL, SPRAY (ML) MUCOUS MEMBRANE DAILY
Qty: 30 TAB | Refills: 0 | Status: SHIPPED | OUTPATIENT
Start: 2020-01-30 | End: 2020-03-05

## 2020-01-30 RX ORDER — PROMETHAZINE HYDROCHLORIDE 12.5 MG/1
12.5 SUPPOSITORY RECTAL
Qty: 30 SUPPOSITORY | Refills: 0 | Status: SHIPPED | OUTPATIENT
Start: 2020-01-30 | End: 2020-02-06

## 2020-01-30 RX ORDER — HYDROXYZINE 25 MG/1
TABLET, FILM COATED ORAL
COMMUNITY

## 2020-01-30 NOTE — PROGRESS NOTES
Chief Complaint   Patient presents with    Establish Care    Medication Refill    Anxiety    Medication Evaluation    Heartburn     1. Have you been to the ER, urgent care clinic since your last visit? Hospitalized since your last visit? No    2. Have you seen or consulted any other health care providers outside of the [address] [place] [address] since your last visit? Include any pap smears or colon screening.  No

## 2020-01-30 NOTE — PROGRESS NOTES
HPI:   Cecilia Mariano is a 37 y.o. male who presents to [facility] care. He is accompanied by his wife today who also sees me for primary care. Reports a history of GERD. He is using Tums as needed without improvement. He has had a gastroenterology evaluation in the past. Had a swallowing study which was positive for gastroparesis. Not currently followed by gastroenterology due to lack of insurance. Works for [employer] full-time. He is . He has several children. Reports a history of anxiety and depression which is well tolerated on Zoloft. There is a history of ADHD. Reports difficulty with rigid schedule and habit formation which has negatively impacted his family and his work. Finished high school. No formal neuropsych test.  Wife reports anger outbursts as well. Current Outpatient Medications   Medication Sig Dispense Refill    hydroxyzine HCL (ATARAX) 25 mg tablet Take  by mouth three (3) times daily as needed for Itching.  sertraline (ZOLOFT) 100 mg tablet Take 1 Tab by mouth daily. Indications: repeated episodes of anxiety 30 Tab 5    buPROPion XL (WELLBUTRIN XL) 150 mg tablet Take 1 Tab by mouth every morning. 30 Tab 3    Omeprazole delayed release (PRILOSEC D/R) 20 mg tablet Take 1 Tab by mouth daily. 30 Tab 0    promethazine (PHENERGAN) 12.5 mg suppository Insert 1 Suppository into rectum every six (6) hours as needed for Nausea for up to 7 days. 30 Suppository 0      Allergies   Allergen Reactions    Iodinated Contrast Media Hives     abruptly got multiple hives all over body       Patient Active Problem List   Diagnosis Code    Asthma J45.909    Chronic obstructive pulmonary disease (HCC) J44.9    Generalized anxiety disorder F41.1    Overweight (BMI 25.0-29. 9) E66.3    Anxiety and depression F41.9, F32.9    Prediabetes R73.03    Left hand pain M79.642     Past Medical History:   Diagnosis Date    Asthma     Cellulitis and abscess of leg 7/9/2015    Chronic obstructive pulmonary disease ([facility]     had collapsed lung and pna    Generalized anxiety disorder 2015    Hypokalemia 2015    Left hand pain 10/16/2017    Obesity (BMI 30-39. 9) 2015    Pneumomediastinum ([facility] 2012    Prediabetes 2015    6.0    Vomiting 09/10/2015     Carmen Sterling-EGD&gstric emptying planned- seeing Dr. Chey Murdock- next 2017      History reviewed. No pertinent surgical history. No LMP for male patient. Family History   Problem Relation Age of Onset    Diabetes Father     Diabetes Maternal Uncle     Cancer Maternal Uncle     Cancer Paternal Uncle     Cancer Mother         Uterine    Cancer Maternal Grandfather         leukemia    Diabetes Maternal Uncle     Kidney Disease Maternal Uncle         on dialysis      Social History     Socioeconomic History    Marital status:      Spouse name: see social hx    Number of children: 3    Years of education: Not on file    Highest education level: Not on file   Occupational History     Employer: [employer]: has worked in multiple areas   Social Needs    Financial resource strain: Not on file    Food insecurity:     Worry: Not on file     Inability: Not on file   [employer] needs:     Medical: Not on file     Non-medical: Not on file   Tobacco Use    Smoking status: Former Smoker     Packs/day: 0.25     Years: 22.00     Pack years: 5.50     Last attempt to quit: 10/23/2013     Years since quittin.2    Smokeless tobacco: Never Used   Substance and Sexual Activity    Alcohol use: Not Currently     Comment:  started drinking heavily in . struggled with alcoholism. quit in . no complicated w/d.  Drug use: Yes     Frequency: 7.0 times per week     Types: Marijuana     Comment: daily, using daily since about     Sexual activity: Yes     Partners: Female     Birth control/protection: I.U.D., Surgical     Comment: two partners.     Lifestyle    Physical activity: Days per week: Not on file     Minutes per session: Not on file    Stress: Not on file   Relationships    Social connections:     Talks on phone: Not on file     Gets together: Not on file     Attends Buddhist service: Not on file     Active member of club or organization: Not on file     Attends meetings of clubs or organizations: Not on file     Relationship status: Not on file    Intimate partner violence:     Fear of current or ex partner: Not on file     Emotionally abused: Not on file     Physically abused: Not on file     Forced sexual activity: Not on file   Other Topics Concern    Not on file   Social History Narrative    All live together in an apartment    [place]- g/f, 1 child- Julienne Arce (youngest) (+2 from a prior partner)    Jazmín Zuniga- wife, 2 children         Prosper 2012    Nikolay Blanco 2007    Concepción Mitchell 2007    Hema Miller 2002        ROS:   Review of Systems   Constitutional: Negative for malaise/fatigue. Eyes: Negative for blurred vision. Respiratory: Negative for shortness of breath. Cardiovascular: Negative for chest pain. Psychiatric/Behavioral: The patient is nervous/anxious. Physical Exam:     Visit Vitals  BP (!) 142/99   Pulse 73   Temp 99 °F (37.2 °C) (Oral)   Resp 16   Ht 5' 11\" (1.803 m)   Wt 245 lb (111.1 kg)   SpO2 98%   BMI 34.17 kg/m²        Vitals and Nurse Documentation reviewed. Physical Exam  Constitutional:       General: He is not in acute distress. Cardiovascular:      Heart sounds: S1 normal and S2 normal. No murmur. No friction rub. No gallop. Pulmonary:      Effort: No respiratory distress. Breath sounds: Normal breath sounds. Skin:     General: Skin is warm and dry. Psychiatric:         Mood and Affect: Affect normal. Mood is anxious. Behavior: Behavior is agitated. Judgment: Judgment is impulsive. Assessment/ Plan:   [employer] were discussed including hours of operation, on-call providers, and MyChart.     Diagnoses and all orders for this visit:    1. Anxiety and depression  -     sertraline (ZOLOFT) 100 mg tablet; Take 1 Tab by mouth daily. Indications: repeated episodes of anxiety  -     buPROPion XL (WELLBUTRIN XL) 150 mg tablet; Take 1 Tab by mouth every morning. Referred to [facility] so we can obtain psychiatric evaluation. Consider [facility] as well. Add wellbutrin at this time. 2. Gastroesophageal reflux disease without esophagitis  -     Omeprazole delayed release (PRILOSEC D/R) 20 mg tablet; Take 1 Tab by mouth daily. Restart treatment as above. 3. Gastroparesis  -     promethazine (PHENERGAN) 12.5 mg suppository; Insert 1 Suppository into rectum every six (6) hours as needed for Nausea for up to 7 days. Refilled. He is unable to follow up with gastroenterology due to lack of insurance. Follow-up and Dispositions    · Return in about 4 weeks (around 2/27/2020) for Follow Up.

## 2020-03-04 DIAGNOSIS — K21.9 GASTROESOPHAGEAL REFLUX DISEASE WITHOUT ESOPHAGITIS: ICD-10-CM

## 2020-03-05 RX ORDER — OMEPRAZOLE 20 MG/1
CAPSULE, DELAYED RELEASE ORAL
Qty: 30 CAP | Refills: 0 | Status: SHIPPED | OUTPATIENT
Start: 2020-03-05 | End: 2020-03-27

## 2020-03-27 DIAGNOSIS — K21.9 GASTROESOPHAGEAL REFLUX DISEASE WITHOUT ESOPHAGITIS: ICD-10-CM

## 2020-03-27 RX ORDER — OMEPRAZOLE 20 MG/1
CAPSULE, DELAYED RELEASE ORAL
Qty: 30 CAP | Refills: 0 | Status: SHIPPED | OUTPATIENT
Start: 2020-03-27 | End: 2020-05-01

## 2020-04-15 ENCOUNTER — TELEPHONE (OUTPATIENT)
Dept: FAMILY MEDICINE CLINIC | Age: 40
End: 2020-04-15

## 2020-04-15 NOTE — TELEPHONE ENCOUNTER
----- Message from Jackie Molina sent at 4/15/2020 11:56 AM EDT -----  Regarding: NP, Gold/Telephone  Env General Message/Vendor Calls    Caller's first and last name: Aicha Thompson (wife)      Reason for call: Regarding her  having issues with his Rx.        Call back required yes/no and why: Yes       Best contact number(s): [phone]      Details to clarify the request:      Jackie Molina

## 2020-04-15 NOTE — TELEPHONE ENCOUNTER
Outbound call to patient. Patient states that he does not feel that medication zoloft, and bupropion are working. Patient is more agitated than usual, and would like medication adjustment. Advised would need office visit. Understanding verbalized. Patient will reactivate my chart portal and get appointment scheduled.

## 2020-05-01 DIAGNOSIS — K21.9 GASTROESOPHAGEAL REFLUX DISEASE WITHOUT ESOPHAGITIS: ICD-10-CM

## 2020-05-01 RX ORDER — OMEPRAZOLE 20 MG/1
CAPSULE, DELAYED RELEASE ORAL
Qty: 30 CAP | Refills: 0 | Status: SHIPPED | OUTPATIENT
Start: 2020-05-01 | End: 2020-06-08

## 2020-06-25 ENCOUNTER — TELEPHONE (OUTPATIENT)
Dept: FAMILY MEDICINE CLINIC | Age: 40
End: 2020-06-25

## 2020-06-25 DIAGNOSIS — F32.A ANXIETY AND DEPRESSION: ICD-10-CM

## 2020-06-25 DIAGNOSIS — F41.9 ANXIETY AND DEPRESSION: ICD-10-CM

## 2020-06-25 DIAGNOSIS — K21.9 GASTROESOPHAGEAL REFLUX DISEASE WITHOUT ESOPHAGITIS: ICD-10-CM

## 2020-06-25 NOTE — TELEPHONE ENCOUNTER
----- Message from Nimisha Raman sent at 6/24/2020  9:23 AM EDT -----  Regarding: [place] / Refill  Contact: [phone]  Caller (if not patient): Margaux Carter  Relationship of caller (if not patient): Girlfriend   Best contact number(s): [phone]  Name of medication and dosage if known: Pt. is unaware of any of the medication dosages and name of Antacid heartburn medicine. Pt. also needs refills for: Zoloft, \"Welbutrin. \"  Is patient out of this medication (yes/no): no  Pharmacy name: [facility] listed in chart? (yes/no): yes  Pharmacy phone number: n/a   Date of last visit: January 30, 2020  Details to clarify the request: n/a

## 2020-06-26 RX ORDER — BUPROPION HYDROCHLORIDE 150 MG/1
TABLET ORAL
Qty: 30 TAB | Refills: 2 | Status: SHIPPED | OUTPATIENT
Start: 2020-06-26

## 2020-06-26 RX ORDER — SERTRALINE HYDROCHLORIDE 100 MG/1
100 TABLET, FILM COATED ORAL DAILY
Qty: 30 TAB | Refills: 5 | Status: SHIPPED | OUTPATIENT
Start: 2020-06-26

## 2020-06-26 RX ORDER — OMEPRAZOLE 20 MG/1
CAPSULE, DELAYED RELEASE ORAL
Qty: 30 CAP | Refills: 2 | Status: SHIPPED | OUTPATIENT
Start: 2020-06-26 | End: 2021-01-15

## 2020-07-21 ENCOUNTER — VIRTUAL VISIT (OUTPATIENT)
Dept: FAMILY MEDICINE CLINIC | Age: 40
End: 2020-07-21

## 2020-07-22 ENCOUNTER — VIRTUAL VISIT (OUTPATIENT)
Dept: FAMILY MEDICINE CLINIC | Age: 40
End: 2020-07-22

## 2020-07-22 DIAGNOSIS — L65.9 BALDNESS: Primary | ICD-10-CM

## 2020-07-22 DIAGNOSIS — F41.0 PANIC DISORDER: ICD-10-CM

## 2020-07-22 RX ORDER — KETOCONAZOLE 20 MG/ML
SHAMPOO TOPICAL
Qty: 1 BOTTLE | Refills: 4 | Status: SHIPPED | OUTPATIENT
Start: 2020-07-22 | End: 2020-11-11 | Stop reason: ALTCHOICE

## 2020-07-22 NOTE — PROGRESS NOTES
Assessment/Plan:     Diagnoses and all orders for this visit:    1. Baldness  -     ketoconazole (NIZORAL) 2 % shampoo; Apply to affected areas. Leave on for 10 minutes, then rinse. Repeat nightly for 7 days, then twice weekly. Treatment as above. Consider dermatology evaluation if no improvement. 2. Panic disorder    Referred to [facility] as he is currently without insurance. Follow-up and Dispositions    · Return if symptoms worsen or fail to improve. Discussed expected course/resolution/complications of diagnosis in detail with patient. Medication risks/benefits/costs/interactions/alternatives discussed with patient. Pt was given after visit summary which includes diagnoses, current medications & vitals. Pt expressed understanding with the diagnosis and plan      The patient has consented for synchronous (real-time) Telemedicine (audio technology) on  7/22/20   for their care to be delivered over telemedicine in place of their regularly scheduled office visit pursuant to the emergency declaration under the [facility], [facility] waiver authority and the [employer] and [employer] Act, this Virtual  Visit was conducted, with patient's consent, to reduce the patient's risk of exposure to COVID-19 and provide continuity of care for an established patient. Visit Length: 20 minutes      Subjective:      Van Hamlin is a 41 y.o. male who presents for had concerns including Anxiety. Reports a several month history of alopecia. There is hair loss on the face and scalp. No associated rash. Symptoms began one month after his last evaluation. Denies a history of similar symptoms. Reports a worsening of his panic. Interfering with work activities. He has walked out of work several times.   Girlfriend reports significant anger at home although denies that any of this is physical.  He is currently going through a divorce with his previous wife. They have seen no improvement with the addition of Zoloft and Wellbutrin from his previous visit. Patient Active Problem List   Diagnosis Code    Asthma J45.909    Chronic obstructive pulmonary disease (HCC) J44.9    Generalized anxiety disorder F41.1    Overweight (BMI 25.0-29. 9) [id number]    Anxiety and depression F41.9, F32.9    Prediabetes R73.03    Left hand pain M79.642       Current Outpatient Medications   Medication Sig Dispense Refill    ketoconazole (NIZORAL) 2 % shampoo Apply to affected areas. Leave on for 10 minutes, then rinse. Repeat nightly for 7 days, then twice weekly. 1 Bottle 4    buPROPion XL (WELLBUTRIN XL) 150 mg tablet TAKE 1 TABLET BY MOUTH EVERY MORNING. 30 Tab 2    sertraline (ZOLOFT) 100 mg tablet Take 1 Tab by mouth daily. Indications: repeated episodes of anxiety 30 Tab 5    omeprazole (PRILOSEC) 20 mg capsule TAKE ONE CAPSULE BY MOUTH DAILY 30 Cap 2    hydroxyzine HCL (ATARAX) 25 mg tablet Take  by mouth three (3) times daily as needed for Itching. Allergies   Allergen Reactions    Iodinated Contrast Media Hives     abruptly got multiple hives all over body        ROS:   Review of Systems   Constitutional: Negative for malaise/fatigue. Eyes: Negative for blurred vision. Respiratory: Negative for shortness of breath. Cardiovascular: Negative for chest pain. Psychiatric/Behavioral: Negative for depression, hallucinations, substance abuse and suicidal ideas. The patient is nervous/anxious. Objective: There were no vitals taken for this visit. Vitals and Nurse Documentation reviewed. Physical Exam  Constitutional:       Appearance: Normal appearance. Neurological:      Mental Status: He is alert. Psychiatric:         Attention and Perception: Attention normal.         Mood and Affect: Mood is anxious.          Speech: Speech normal.         Behavior: Behavior normal.         Cognition and Memory: Cognition normal.

## 2020-11-11 ENCOUNTER — OFFICE VISIT (OUTPATIENT)
Dept: FAMILY MEDICINE CLINIC | Age: 40
End: 2020-11-11

## 2020-11-11 VITALS
BODY MASS INDEX: 33.74 KG/M2 | RESPIRATION RATE: 16 BRPM | OXYGEN SATURATION: 97 % | TEMPERATURE: 98.2 F | SYSTOLIC BLOOD PRESSURE: 126 MMHG | HEIGHT: 71 IN | HEART RATE: 60 BPM | DIASTOLIC BLOOD PRESSURE: 82 MMHG | WEIGHT: 241 LBS

## 2020-11-11 DIAGNOSIS — R22.2 MASS OF SKIN OF ABDOMEN: Primary | ICD-10-CM

## 2020-11-11 DIAGNOSIS — R22.2 MASS OF SKIN OF BACK: ICD-10-CM

## 2020-11-11 DIAGNOSIS — L65.9 PATCHY LOSS OF HAIR: ICD-10-CM

## 2020-11-11 PROCEDURE — 99213 OFFICE O/P EST LOW 20 MIN: CPT | Performed by: NURSE PRACTITIONER

## 2020-11-11 RX ORDER — LAMOTRIGINE 25 MG/1
25 TABLET ORAL DAILY
COMMUNITY
Start: 2020-11-10

## 2020-11-11 NOTE — PROGRESS NOTES
Assessment/Plan:     Diagnoses and all orders for this visit:    1. Mass of skin of abdomen  -     US ABD LTD; Future  Lipoma versus unclear etiology. US pending. Follow up as needed. 2. Mass of skin of back  -     US ABD LTD; Future    3. Patchy loss of hair  Unclear etiology. Follow up with dermatology as discussed. Follow-up and Dispositions    · Return if symptoms worsen or fail to improve. Discussed expected course/resolution/complications of diagnosis in detail with patient. Medication risks/benefits/costs/interactions/alternatives discussed with patient. Pt was given after visit summary which includes diagnoses, current medications & vitals. Pt expressed understanding with the diagnosis and plan          Subjective:      Ayden Bowman is a 41 y.o. male who presents for had concerns including Mass (noticed lumps years ago. Around the rib cage area and large bumps on the back. painful to touch. ). He reports painful nodules that he first noticed 2-3 years ago. Over the years new nodules have presented and grown in size. He reports losing hair in patches he was taking ketoconazole, used daily for a week. Rash developed one week ago. No family history of balding in father or grandfather. Patient Active Problem List   Diagnosis Code    Asthma J45.909    Chronic obstructive pulmonary disease (HCC) J44.9    Generalized anxiety disorder F41.1    Overweight (BMI 25.0-29. 9) E66.3    Anxiety and depression F41.9, F32.9    Prediabetes R73.03    Left hand pain M79.642       Current Outpatient Medications   Medication Sig Dispense Refill    lamoTRIgine (LaMICtal) 25 mg tablet Take 25 mg by mouth daily. 2 tab daily      multivit-min/FA/lycopen/lutein (CENTRUM SILVER MEN PO) Take  by mouth.  buPROPion XL (WELLBUTRIN XL) 150 mg tablet TAKE 1 TABLET BY MOUTH EVERY MORNING. 30 Tab 2    sertraline (ZOLOFT) 100 mg tablet Take 1 Tab by mouth daily.  Indications: repeated episodes of anxiety (Patient taking differently: Take 150 mg by mouth daily. Indications: repeated episodes of anxiety) 30 Tab 5    omeprazole (PRILOSEC) 20 mg capsule TAKE ONE CAPSULE BY MOUTH DAILY 30 Cap 2    hydroxyzine HCL (ATARAX) 25 mg tablet Take  by mouth three (3) times daily as needed for Itching. Allergies   Allergen Reactions    Iodinated Contrast Media Hives     abruptly got multiple hives all over body        ROS:   Review of Systems   Constitutional: Negative for malaise/fatigue. Eyes: Negative for blurred vision. Respiratory: Negative for shortness of breath. Cardiovascular: Negative for chest pain. Skin: Positive for rash. Objective:     Visit Vitals  /82 (BP 1 Location: Left arm, BP Patient Position: Sitting)   Pulse 60   Temp 98.2 °F (36.8 °C) (Oral)   Resp 16   Ht 5' 11\" (1.803 m)   Wt 241 lb (109.3 kg)   SpO2 97%   BMI 33.61 kg/m²       Vitals and Nurse Documentation reviewed. Physical Exam  Constitutional:       General: He is not in acute distress. Cardiovascular:      Heart sounds: S1 normal and S2 normal. No murmur. No friction rub. No gallop. Pulmonary:      Effort: No respiratory distress. Breath sounds: Normal breath sounds. Skin:     General: Skin is warm and dry. Findings: Lesion present.       Comments: Patches of hair loss sporadic throughout the scalp   Psychiatric:         Mood and Affect: Mood and affect normal.

## 2020-11-11 NOTE — PROGRESS NOTES
Chief Complaint   Patient presents with    Mass     noticed lumps years ago. Around the rib cage area and large bumps on the back. painful to touch. 1. Have you been to the ER, urgent care clinic since your last visit? Hospitalized since your last visit? No     2. Have you seen or consulted any other health care providers outside of the [facility] since your last visit? Include any pap smears or colon screening. Yes, Mary Garcia at [facility].

## 2020-11-18 ENCOUNTER — HOSPITAL ENCOUNTER (OUTPATIENT)
Dept: ULTRASOUND IMAGING | Age: 40
Discharge: HOME | End: 2020-11-18
Attending: NURSE PRACTITIONER
Payer: MEDICAID

## 2020-11-18 DIAGNOSIS — R22.2 MASS OF SKIN OF BACK: ICD-10-CM

## 2020-11-18 DIAGNOSIS — R22.2 MASS OF SKIN OF ABDOMEN: ICD-10-CM

## 2020-11-18 PROCEDURE — 76705 ECHO EXAM OF ABDOMEN: CPT

## 2020-11-24 ENCOUNTER — OFFICE VISIT (OUTPATIENT)
Dept: URGENT CARE | Age: 40
End: 2020-11-24
Payer: MEDICAID

## 2020-11-24 VITALS — RESPIRATION RATE: 18 BRPM | OXYGEN SATURATION: 98 % | TEMPERATURE: 98.3 F | HEART RATE: 71 BPM

## 2020-11-24 DIAGNOSIS — Z20.822 SUSPECTED COVID-19 VIRUS INFECTION: ICD-10-CM

## 2020-11-24 DIAGNOSIS — Z20.822 EXPOSURE TO COVID-19 VIRUS: Primary | ICD-10-CM

## 2020-11-24 PROCEDURE — 99213 OFFICE O/P EST LOW 20 MIN: CPT | Performed by: NURSE PRACTITIONER

## 2020-11-24 NOTE — PROGRESS NOTES
Subjective: (As above and below)       This patient was seen in [facility] at [address] [place] [address] Urgent Care while outdoors at their vehicle due to COVID-19 pandemic with PPE and focused examination in order to decrease community viral transmission. Chief Complaint   Patient presents with    Covid Testing     Covid sx's started last nigt     Robin Friend is a 41 y.o. male who presents for evaluation of : headache, fever, mild cough, body aches after having exposure to covid positive individuals. Symptom onset 1 day ago . Preceding illness: none. No other identified aggravating or alleviating factors. Symptoms are constant and overall unchanged. Promotes no decrease in PO intake of fluids. Denies: severe lethargy, SOB, syncope/near syncope, vomiting/diarrhea, chest pain, chest pain with breathing, labored breathing, severe headache, non-intractable fevers . Recent travel: no  Known Exposure to COVID-19: YES  Known flu or strep contact: no       Review of Systems - negative except as listed above    Reviewed PmHx, RxHx, FmHx, SocHx, AllgHx and updated in chart. Family History   Problem Relation Age of Onset    Diabetes Father     Diabetes Maternal Uncle     Cancer Maternal Uncle     Cancer Paternal Uncle     Cancer Mother         Uterine    Cancer Maternal Grandfather         leukemia    Diabetes Maternal Uncle     Kidney Disease Maternal Uncle         on dialysis       Past Medical History:   Diagnosis Date    Asthma     Cellulitis and abscess of leg 7/9/2015    Chronic obstructive pulmonary disease ([address] 2012    had collapsed lung and pna    Generalized anxiety disorder 7/9/2015    Hypokalemia 7/9/2015    Left hand pain 10/16/2017    Obesity (BMI 30-39. 9) 7/9/2015    Pneumomediastinum ([address] 4/2/2012    Prediabetes 07/09/2015    6.0    Vomiting 09/10/2015     Kerm[facility] Chris-EGD&gstric emptying planned- seeing Dr. Trish Trejo- next Feb 2017      Social History     Socioeconomic History [facility]  85y, Female    All available historical data reviewed    OVERNIGHT EVENTS:    no fevers  feels well and has no complaints   98% face mask  ROS:  General: Denies rigors, night sweats  HEENT: Denies headache, rhinorrhea, sore throat, eye pain  CV: Denies CP, palpitations  PULM: Denies wheezing, hemoptysis  GI: Denies hematemesis, hematochezia, melena  : Denies discharge, hematuria  MSK: Denies arthralgias, myalgias  SKIN: Denies rash, lesions  NEURO: Denies paresthesias, weakness  PSYCH: Denies depression, anxiety    VITALS:  T(F): 96.4, Max: 98.1 (06-09-20 @ 05:02)  HR: 93  BP: 113/78  RR: 18Vital Signs Last 24 Hrs  T(C): 35.8 (09 Jun 2020 12:32), Max: 36.7 (09 Jun 2020 05:02)  T(F): 96.4 (09 Jun 2020 12:32), Max: 98.1 (09 Jun 2020 05:02)  HR: 93 (09 Jun 2020 12:32) (78 - 99)  BP: 113/78 (09 Jun 2020 12:32) (113/78 - 135/87)  BP(mean): 97 (09 Jun 2020 01:05) (97 - 97)  RR: 18 (09 Jun 2020 12:32) (18 - 18)  SpO2: 98% (09 Jun 2020 12:32) (94% - 98%)    TESTS & MEASUREMENTS:                        16.3   12.44 )-----------( 243      ( 09 Jun 2020 07:50 )             48.1     06-09    136  |  88<L>  |  65<HH>  ----------------------------<  156<H>  4.0   |  35<H>  |  1.2    Ca    8.8      09 Jun 2020 07:50  Phos  3.5     06-08  Mg     2.1     06-08    TPro  6.3  /  Alb  3.2<L>  /  TBili  0.5  /  DBili  x   /  AST  11  /  ALT  15  /  AlkPhos  85  06-09    LIVER FUNCTIONS - ( 09 Jun 2020 07:50 )  Alb: 3.2 g/dL / Pro: 6.3 g/dL / ALK PHOS: 85 U/L / ALT: 15 U/L / AST: 11 U/L / GGT: x             Culture - Blood (collected 06-04-20 @ 11:01)  Source: .Blood None  Preliminary Report (06-05-20 @ 22:01):    No growth to date.            RADIOLOGY & ADDITIONAL TESTS:  Personal review of radiological diagnostics performed  Echo and EKG results noted when applicable.     MEDICATIONS:  acetaminophen   Tablet .. 650 milliGRAM(s) Oral every 6 hours PRN  aMIOdarone    Tablet 200 milliGRAM(s) Oral daily  apixaban 5 milliGRAM(s) Oral every 12 hours  atorvastatin 40 milliGRAM(s) Oral at bedtime  buMETAnide 0.5 milliGRAM(s) Oral once  cefTRIAXone   IVPB 1000 milliGRAM(s) IV Intermittent every 24 hours  levothyroxine 125 MICROGram(s) Oral daily  losartan 25 milliGRAM(s) Oral daily  metolazone 2.5 milliGRAM(s) Oral daily  metoprolol succinate  milliGRAM(s) Oral daily  pantoprazole    Tablet 40 milliGRAM(s) Oral two times a day  polyethylene glycol 3350 17 Gram(s) Oral daily  potassium chloride    Tablet ER 40 milliEquivalent(s) Oral daily  senna 2 Tablet(s) Oral at bedtime      ANTIBIOTICS:  cefTRIAXone   IVPB 1000 milliGRAM(s) IV Intermittent every 24 hours  Marital status:      Spouse name: see social hx    Number of children: 3    Years of education: Not on file    Highest education level: Not on file   Occupational History     Employer: [employer]: has worked in multiple areas   Tobacco Use    Smoking status: Former Smoker     Packs/day: 0.25     Years: 22.00     Pack years: 5.50     Last attempt to quit: 10/23/2013     Years since quittin.0    Smokeless tobacco: Never Used   Substance and Sexual Activity    Alcohol use: Not Currently     Comment:  started drinking heavily in . struggled with alcoholism. quit in . no complicated w/d.  Drug use: Yes     Frequency: 7.0 times per week     Types: Marijuana     Comment: daily, using daily since about     Sexual activity: Yes     Partners: Female     Birth control/protection: I.U.D., Surgical     Comment: two partners. Social History Narrative    All live together in an apartment    [place]- g/f, 1 child- Harmony Mo (youngest) (+2 from a prior partner)    Abraham Santa- wife, 2 children         [place]     [facility] [place]     [address]           Current Outpatient Medications   Medication Sig    lamoTRIgine (LaMICtal) 25 mg tablet Take 25 mg by mouth daily. 2 tab daily    multivit-min/FA/lycopen/lutein (CENTRUM SILVER MEN PO) Take  by mouth.  buPROPion XL (WELLBUTRIN XL) 150 mg tablet TAKE 1 TABLET BY MOUTH EVERY MORNING.  sertraline (ZOLOFT) 100 mg tablet Take 1 Tab by mouth daily. Indications: repeated episodes of anxiety (Patient taking differently: Take 150 mg by mouth daily. Indications: repeated episodes of anxiety)    omeprazole (PRILOSEC) 20 mg capsule TAKE ONE CAPSULE BY MOUTH DAILY    hydroxyzine HCL (ATARAX) 25 mg tablet Take  by mouth three (3) times daily as needed for Itching. No current facility-administered medications for this visit.         Objective:     Vitals:    20 0932   Pulse: 71   Resp: 18   Temp: 98.3 °F (36.8 °C)   SpO2: 98% Physical Exam  General appearance  appears well hydrated and does not appear toxic, no acute distress  Eyes - EOMs intact. Non injected. No scleral icterus   Ears - no external swelling  Nose - nasal congestion. No purulent drainage  Mouth - OP clear and moist without swelling, exudate or lesion. Mucus membranes moist. Uvula midline. Neck/Lymphatics  trachea midline, full AROM  Chest - Normal breathing effort no wheeze rales or rhonchi bilat. Heart - RRR, no murmurs  Skin - no observable rashes or pallor  Neurologic- alert and oriented x 3  Psychiatric- normal mood, behavior and though content. Assessment/ Plan:     1. Exposure to COVID-19 virus    - NOVEL CORONAVIRUS (COVID-19)    2. Suspected COVID-19 virus infection    - NOVEL CORONAVIRUS (COVID-19)      No evidence suggesting complication of illness at this time. Will discharge home with close monitoring and follow up. To follow CDC isolation/quarantine guidelines  Supportive home care for mild symptoms advised- maintain adequate fluid intake, lozenges, over the counter Tylenol (for fever, aches, pains, chills), deep breathing exercises  Recommendation for self isolation/quarantine based on current CDC guidelines      Test Results: Follow up: We have reviewed worsening/concerning signs and symptoms that may warrant seeking immediate care in the ED setting. For other non-severe changes, non-improvement or questions, patient aware to contact PCP office or consider a virtual online medical consultation.         Wade Harris NP

## 2020-11-27 LAB — SARS-COV-2, NAA: DETECTED

## 2020-11-27 NOTE — PROGRESS NOTES
Patient aware of positive results. Was here with family member and verbalized he was aware of positive. Aware of reasons to go to ED for any worsening and reviewed quarantine/isolation guidelines.

## 2021-01-14 DIAGNOSIS — K21.9 GASTROESOPHAGEAL REFLUX DISEASE WITHOUT ESOPHAGITIS: ICD-10-CM

## 2021-01-15 RX ORDER — OMEPRAZOLE 20 MG/1
CAPSULE, DELAYED RELEASE ORAL
Qty: 30 CAP | Refills: 1 | Status: SHIPPED | OUTPATIENT
Start: 2021-01-15

## 2021-08-02 ENCOUNTER — OFFICE VISIT (OUTPATIENT)
Dept: NEUROLOGY | Age: 41
End: 2021-08-02
Payer: MEDICAID

## 2021-08-02 DIAGNOSIS — Z86.59 HISTORY OF ADHD: ICD-10-CM

## 2021-08-02 DIAGNOSIS — F41.9 ANXIETY AND DEPRESSION: Primary | ICD-10-CM

## 2021-08-02 DIAGNOSIS — F32.A ANXIETY AND DEPRESSION: Primary | ICD-10-CM

## 2021-08-02 DIAGNOSIS — F12.90 USES MARIJUANA: ICD-10-CM

## 2021-08-02 PROCEDURE — 90791 PSYCH DIAGNOSTIC EVALUATION: CPT | Performed by: CLINICAL NEUROPSYCHOLOGIST

## 2021-08-02 NOTE — PROGRESS NOTES
[address] [facility],[address]  [address]   [place] [address] [place] [address] [address]   [phone] Office   [phone] Fax      Neuropsychology    Initial Diagnostic Interview Note      Referral:  Holsinger, Fredrik Severs, AMELIE    Loc Karimi is a 35 y.o. right handed  male who was accompanied by his girlfriend to the initial clinical interview on 8/2/21. Please refer to his medical records for details pertaining to his history. At the start of the appointment, I reviewed the patient's [facility] Epic Chart (including Media scanned in from previous providers) for the active Problem List, all pertinent Past Medical Hx, medications, recent radiologic and laboratory findings. In addition, I reviewed pt's documented Immunization Record and Encounter History. 11th grade completed and obtained a GED. He repeated 5th grade- he was going to three different school, was in a [employer] family and was moving around in that regard. He then repeated the 8th grade was a culture shock from [place] to being up here and people were just a bit farther along academically than he was. He was diagnosed with ADHD in the 1980s and was on some of the old school medications for that and landed on Ritalin and decided to stop that about the 9th grade because he didn't like it - it was messing with his appetite and mood. He works for [employer] as a . They have been together 7 years and they have five children. He has anxiety and depression and is on Zoloft which is helpful. HE has a hard time with focus and attention and concentration. Hard to stay organized. Easily distracted. Starts tasks and does not complete./ Second guesses himself a lot. He is very forgetful. Forgets the content of conversations. Spouse says he is very forgetful. He has to stay in his box and if he's not in his box he melts down. SO has to tell him this so many times in a row. She has to say it literally over and over again and he will act like he was never told it in the first place. She feels like it is definitely worsening. She would long term jokingly say he has Alzhiemer's. He is currently on propanol prn and he is on it daily, and aripiprazole,lamotrigine, buproprion, vitamins, D3. He is in bed by 730 and up 440 to go to work,  Not using a CPAP machine - not sure why. Appetite is doing fine.       Reports a history of anxiety and depression which is well tolerated on Zoloft. There is a history of ADHD. Reports difficulty with rigid schedule and habit formation which has negatively impacted his family and his work. Finished high school. No    Mood swings are better now that he is on lamotrigine. He can agitated, combative, and tries to pick little fights with the kids, 3 of whom are teenagers. Neuropsychological Mental Status Exam (NMSE):    Historian: Good  Praxis: No UE apraxia  R/L Orientation: Intact to self and to other  Dress: within normal limits   Weight: overweight   Appearance/Hygiene: within normal limits   Gait: within normal limits   Assistive Devices: None  Mood: within normal limits   Affect: within normal limits   Comprehension: within normal limits   Thought Process: within normal limits   Expressive Language: within normal limits   Receptive Language: within normal limits   Motor:  No cognitive or motor perseveration  ETOH: Denied, 8 years sober. Tobacco: Denied  Marijuana: Am and PM a few puffs  Illicit: Denied  SI/HI: Denied   Psychosis: Denied  Insight: Within normal limits  Judgment: Within normal limits  Other Psych:      Past Medical History:   Diagnosis Date    Asthma     Cellulitis and abscess of leg 7/9/2015    Chronic obstructive pulmonary disease ([facility] 2012    had collapsed lung and pna    Generalized anxiety disorder 7/9/2015    Hypokalemia 7/9/2015    Left hand pain 10/16/2017    Obesity (BMI 30-39. 9) 7/9/2015    Pneumomediastinum ([address] 2012    Prediabetes 2015    6.0    Vomiting 09/10/2015     [place]-EGD&gstric emptying planned- seeing Dr. Radha Calhoun- next 2017       No past surgical history on file. Allergies   Allergen Reactions    Iodinated Contrast Media Hives     abruptly got multiple hives all over body        Family History   Problem Relation Age of Onset    Diabetes Father     Diabetes Maternal Uncle     Cancer Maternal Uncle     Cancer Paternal Uncle     Cancer Mother         Uterine    Cancer Maternal Grandfather         leukemia    Diabetes Maternal Uncle     Kidney Disease Maternal Uncle         on dialysis       Social History     Tobacco Use    Smoking status: Former Smoker     Packs/day: 0.25     Years: 22.00     Pack years: 5.50     Quit date: 10/23/2013     Years since quittin.7    Smokeless tobacco: Never Used   Substance Use Topics    Alcohol use: Not Currently     Comment:  started drinking heavily in . struggled with alcoholism. quit in . no complicated w/d.  Drug use: Yes     Frequency: 7.0 times per week     Types: Marijuana     Comment: daily, using daily since about        Current Outpatient Medications   Medication Sig Dispense Refill    omeprazole (PRILOSEC) 20 mg capsule TAKE ONE CAPSULE BY MOUTH DAILY 30 Cap 1    lamoTRIgine (LaMICtal) 25 mg tablet Take 25 mg by mouth daily. 2 tab daily      multivit-min/FA/lycopen/lutein (CENTRUM SILVER MEN PO) Take  by mouth.  buPROPion XL (WELLBUTRIN XL) 150 mg tablet TAKE 1 TABLET BY MOUTH EVERY MORNING. 30 Tab 2    sertraline (ZOLOFT) 100 mg tablet Take 1 Tab by mouth daily. Indications: repeated episodes of anxiety (Patient taking differently: Take 150 mg by mouth daily. Indications: repeated episodes of anxiety) 30 Tab 5    hydroxyzine HCL (ATARAX) 25 mg tablet Take  by mouth three (3) times daily as needed for Itching. Plan:  Obtain authorization for testing from insurance company.   Report to follow once testing, scoring, and interpretation completed. ? Organic based neurocognitive issues versus mood disorder or combination of same. ? Problems organic, functional, or both? This note will not be viewable in [address]

## 2021-12-01 ENCOUNTER — TELEPHONE (OUTPATIENT)
Dept: NEUROLOGY | Age: 41
End: 2021-12-01

## 2021-12-03 ENCOUNTER — OFFICE VISIT (OUTPATIENT)
Dept: NEUROLOGY | Age: 41
End: 2021-12-03
Payer: MEDICAID

## 2021-12-03 DIAGNOSIS — F90.0 ATTENTION DEFICIT HYPERACTIVITY DISORDER (ADHD), INATTENTIVE TYPE, SEVERE: ICD-10-CM

## 2021-12-03 DIAGNOSIS — F32.0 MILD MAJOR DEPRESSION (HCC): ICD-10-CM

## 2021-12-03 DIAGNOSIS — F41.9 MODERATE ANXIETY: Primary | ICD-10-CM

## 2021-12-03 PROCEDURE — 96130 PSYCL TST EVAL PHYS/QHP 1ST: CPT | Performed by: CLINICAL NEUROPSYCHOLOGIST

## 2021-12-03 PROCEDURE — 96138 PSYCL/NRPSYC TECH 1ST: CPT | Performed by: CLINICAL NEUROPSYCHOLOGIST

## 2021-12-03 PROCEDURE — 96131 PSYCL TST EVAL PHYS/QHP EA: CPT | Performed by: CLINICAL NEUROPSYCHOLOGIST

## 2021-12-03 PROCEDURE — 96139 PSYCL/NRPSYC TST TECH EA: CPT | Performed by: CLINICAL NEUROPSYCHOLOGIST

## 2021-12-03 PROCEDURE — 96137 PSYCL/NRPSYC TST PHY/QHP EA: CPT | Performed by: CLINICAL NEUROPSYCHOLOGIST

## 2021-12-03 PROCEDURE — 96136 PSYCL/NRPSYC TST PHY/QHP 1ST: CPT | Performed by: CLINICAL NEUROPSYCHOLOGIST

## 2021-12-03 NOTE — LETTER
12/4/2021    Patient: Isaias Habermann   YOB: 1980   Date of Visit: 12/3/2021     Claudeen Athens, NP  [address] [address]  [address] [employer]    Dear Claudeen Athens, NP,      Thank you for referring Mr. Isaias Habermann to [facility] [address] for evaluation. My notes for this consultation are attached. If you have questions, please do not hesitate to call me. I look forward to following your patient along with you.       Sincerely,    Severo Koh, PsyD

## 2021-12-05 NOTE — PROGRESS NOTES
[facility],[address]  [address]   [place] [address] [place] [facility] Hospital[facility]ilario kennedy    [phone] Office   [phone] Fax      Psychological Evaluation Report  Referral:  Chapincito Malcolm, NP    Hedy Fong is a 35 y.o. right handed  male who was accompanied by his girlfriend to the initial clinical interview on 8/2/21. Please refer to his medical records for details pertaining to his history. At the start of the appointment, I reviewed the patient's [facility] Epic Chart (including Media scanned in from previous providers) for the active Problem List, all pertinent Past Medical Hx, medications, recent radiologic and laboratory findings. In addition, I reviewed pt's documented Immunization Record and Encounter History. 11th grade completed and obtained a GED. He repeated 5th grade- he was going to three different school, was in a [employer] family and was moving around in that regard. He then repeated the 8th grade was a culture shock from [place] to being up here and people were just a bit farther along academically than he was. He was diagnosed with ADHD in the 1980s and was on some of the old school medications for that and landed on Ritalin and decided to stop that about the 9th grade because he didn't like it - it was messing with his appetite and mood. He works for [employer] as a . They have been together 7 years and they have five children. He has anxiety and depression and is on Zoloft which is helpful. HE has a hard time with focus and attention and concentration. Hard to stay organized. Easily distracted. Starts tasks and does not complete./ Second guesses himself a lot. He is very forgetful. Forgets the content of conversations. Spouse says he is very forgetful. He has to stay in his box and if he's not in his box he melts down. SO has to tell him this so many times in a row.   She has to say it literally over and over again and he will act like he was never told it in the first place. She feels like it is definitely worsening. She would [facility] jokingly say he has Alzhiemer's. He is currently on propanol prn and he is on it daily, and aripiprazole,lamotrigine, buproprion, vitamins, D3. He is in bed by 730 and up 440 to go to work,  Not using a CPAP machine - not sure why. Appetite is doing fine.       Reports a history of anxiety and depression which is well tolerated on Zoloft. There is a history of ADHD. Reports difficulty with rigid schedule and habit formation which has negatively impacted his family and his work. Finished high school. No    Mood swings are better now that he is on lamotrigine. He can agitated, combative, and tries to pick little fights with the kids, 3 of whom are teenagers. Neuropsychological Mental Status Exam (NMSE):    Historian: Good  Praxis: No UE apraxia  R/L Orientation: Intact to self and to other  Dress: within normal limits   Weight: overweight   Appearance/Hygiene: within normal limits   Gait: within normal limits   Assistive Devices: None  Mood: within normal limits   Affect: within normal limits   Comprehension: within normal limits   Thought Process: within normal limits   Expressive Language: within normal limits   Receptive Language: within normal limits   Motor:  No cognitive or motor perseveration  ETOH: Denied, 8 years sober. Tobacco: Denied  Marijuana: Am and PM a few puffs  Illicit: Denied  SI/HI: Denied   Psychosis: Denied  Insight: Within normal limits  Judgment: Within normal limits  Other Psych:      Past Medical History:   Diagnosis Date    Asthma     Cellulitis and abscess of leg 7/9/2015    Chronic obstructive pulmonary disease ([address] 2012    had collapsed lung and pna    Generalized anxiety disorder 7/9/2015    Hypokalemia 7/9/2015    Left hand pain 10/16/2017    Obesity (BMI 30-39. 9) 7/9/2015    Pneumomediastinum ([address] 4/2/2012    Prediabetes 2015    6.0    Vomiting 09/10/2015     Kermit Chris-EGD&gstric emptying planned- seeing Dr. Larry Avitia- next 2017       No past surgical history on file. Allergies   Allergen Reactions    Iodinated Contrast Media Hives     abruptly got multiple hives all over body        Family History   Problem Relation Age of Onset    Diabetes Father     Diabetes Maternal Uncle     Cancer Maternal Uncle     Cancer Paternal Uncle     Cancer Mother         Uterine    Cancer Maternal Grandfather         leukemia    Diabetes Maternal Uncle     Kidney Disease Maternal Uncle         on dialysis       Social History     Tobacco Use    Smoking status: Former Smoker     Packs/day: 0.25     Years: 22.00     Pack years: 5.50     Quit date: 10/23/2013     Years since quittin.1    Smokeless tobacco: Never Used   Substance Use Topics    Alcohol use: Not Currently     Comment:  started drinking heavily in . struggled with alcoholism. quit in . no complicated w/d.  Drug use: Yes     Frequency: 7.0 times per week     Types: Marijuana     Comment: daily, using daily since about        Current Outpatient Medications   Medication Sig Dispense Refill    omeprazole (PRILOSEC) 20 mg capsule TAKE ONE CAPSULE BY MOUTH DAILY 30 Cap 1    lamoTRIgine (LaMICtal) 25 mg tablet Take 25 mg by mouth daily. 2 tab daily      multivit-min/FA/lycopen/lutein (CENTRUM SILVER MEN PO) Take  by mouth.  buPROPion XL (WELLBUTRIN XL) 150 mg tablet TAKE 1 TABLET BY MOUTH EVERY MORNING. 30 Tab 2    sertraline (ZOLOFT) 100 mg tablet Take 1 Tab by mouth daily. Indications: repeated episodes of anxiety (Patient taking differently: Take 150 mg by mouth daily. Indications: repeated episodes of anxiety) 30 Tab 5    hydroxyzine HCL (ATARAX) 25 mg tablet Take  by mouth three (3) times daily as needed for Itching. Plan:  Obtain authorization for testing from insurance company.   Report to follow once testing, scoring, and interpretation completed. ? Organic based neurocognitive issues versus mood disorder or combination of same. ? Problems organic, functional, or both? This note will not be viewable in [address] Psychological Evaluation Results Follow  Patient Testing 12/3/21 Report Completed 12/4/21  A Psychometrist Assisted w/ portions of this windy/luation while under my direct supervision    Neuropsychologist Administered, Interpreted, & Reported: Neuropsychological Mental Status Exam, Revised Memory & Behavior Checklist, MMSE, Clock Drawing, Test Of Premorbid Functioning, [facility] Adult ADD Scales, History Taking  & Clinical Interview With The Patient,  CLARISA, CPT, SRS-2, GARS-3, Beni-Melzack Pain Questionnaire, Review Of Available Records*. Psychometrist Administered & Neuropsychologist Interpreted & Neuropsychologist Reported: Speech-Sounds Perception Test, ANGEL, Paced Serial Addition Test, Wechsler Adult Intelligence Scale  IV, Verbal Fluency Tests, Florentino & Florentino  Revised, Trailmaking Test Parts A & B, Buschke Selective Reminding Test, Sukhdev Complex Figure Test, Bryson Depression Inventory  II, Bryson Anxiety Inventory,. Test Findings:  Note:  The patients raw data have been compared with currently available norms which include demographic corrections for age, gender, and/or education. Sometimes, the patients scores are compared to demographically similar individuals as close to the patients age, education level, etc., as possible. \"Average\" is viewed as being +/- 1 standard deviation (SD) from the stated mean for a particular test score. \"Low average\" is viewed as being between 1 and 2 SD below the mean, and above average is viewed as being 1 and 2 SD above the mean. Scores falling in the borderline range (between 1-1/2 and 2 SD below the mean) are viewed with particular attention as to whether they are normal or abnormal neurocognitive test scores.   Other methods of inference in analyzing the test data are also utilized, including the pattern and range of scores in the profile, bilateral motor functions, and the presence, if any, of pathognomonic signs. Behaviorally, the patient was friendly and cooperative and appeared motivated to perform well during this examination. Within this context, the results of this evaluation are viewed as a valid reflection of the patients actual neurocognitive and emotional status. The patient's self reported score of 63 on the Zana Olp Adult ADD Scales was within the elevated risk range for ADD related concerns. The patient was administered the [facility] Continuous Performance Test  III, a computer-administered test of sustained attention, and review of the subscales within this instrument revealed moderate to severe concerns for inattentiveness with additional concern for impulsivity. Auditory attention and discrimination, as assessed by the ANGEL, was also impaired. High-level auditory information processing speed, as assessed by the PASAT, was arranged for trial 2 (+0.20 SD). This pattern of performance is indicative of a patient is at increased risk for day-to-day problems with sustained visual attention and auditory attention. High-level auditory visual processing speed is normal.     The patient was administered the Wechsler Adult Intelligence Scale - IV. See Appendix I (in media section of this EMR) for full breakdown of IQ scores. There was a clinically significant difference between his average range Working Memory Index score of 100 (50th %ile) and his low average range Processing Speed Index score of 81 (10th %ile). His Verbal Comprehension Index score of 105 (63rd %ile) was within the normal range. His Perceptual Reasoning Index score of 100 (50th %Ile) was also within the normal range. This pattern of performance is not indicative of a patient who is at increased risk for day-to-day problems with working memory and/or processing speed. Verbal comprehension and perceptual reasoning abilities are normal.  Processing speed is lower than expected based on his performance on a task assessing premorbid functioning levels. The patient was administered the Buschke Selective Reminding Test and his basic learning and memory (116/144) was normal.  In this regard, his consistency related long term retrieval was impaired (46/144 correct), especially when compared to his normal range [employer] (101/133). His discrepancy score (+55 points) is clinically significant and is suggestive of a high level cognitive organization problem and/or high level attention concern. Simple timed visual motor sequencing (Trailmaking Test Part A) was within the average range with a T score of 50. The patient's performance on a similar, but more complex task of timed visual motor sequencing (Trailmaking Test Part B) was within the average range with a T score of 47. The patient made zero sequencing errors on this latter test.  Taken together, this pattern of performance is not indicative of a patient who is at increased risk for day-to-day problems with frontal lobe-mediated executive functioning abilities. The patient rated his current level of pain as \"1/5  - Mild\" on the Beni-Melzack Pain Questionnaire. He reported pain in his head, left hand, and left knee. His Bryson Depression Inventory - II score of 17 was within the mildly depressed range. His Bryson Anxiety Inventory score of 17 reflected moderate anxiety. Scores on the Social Responsiveness Scale- 2 (T= 53) and the [address] (AI = 68) are not consistent with an autism spectrum disorder. Any autistic type tendencies that are observed are much more likely to be related to a non-ASD condition. The patient was also administered the Personality Assessment Inventory and generated a valid profile for interpretation. Within this context, mild depression is present.  He experiences a great deal of tension, has difficulty relaxing, and lately encounters fatigue as a result high perceived stress. He is quite stephenson and emotionally labile, with the mood swings being rapid and extreme. He likely experiences episodes of poorly controlled anger. His use of drugs has been a source of some problems and difficulties in his life. Others may view him as pragmatic and unsympathetic in his relationships. He is likely to self-conscious in social interactions and is probably not skilled in asserting himself. His self-reported level of treatment motivation is low. Impressions and Recommendations:  From the actual neurocognitive profile, there is support for a diagnosis of inattentive ADHD. It is a moderate to severe problem. He is also showing problems with high-level cognitive organization. Processing speed is an area of relative intellectual weakness for him and other IQ domain scores are fully normal. Learning, memory, and executive functioning abilities are normal. From an emotional standpoint, there is moderate anxiety and mild depression. He is showing some very mild antisocial personality characteristics. I don't believe that this rises to the degree whereby the diagnosis of personality disorder is warranted, but he does have some personality traits that perhaps contributed to some anger management and interpersonal dynamics related difficulties. I suggest he do some reading on antisocial personality characteristics and see whether or not he thinks some of these are just exit with how he relates with other people. Use of marijuana is reported as well. I see the ADHDinattentive issue is chronic, organic, and surprisingly rather severe. Emotional distress issues appear to be combination of functional and organic problems.  In addition to continued medical care, my recommendations include  consideration for a 30-day trial of an appropriate medication for attention, if this is not medically contraindicated. Caution is advised in selecting same, given the anxiety. During this trial, the patient should keep track of his response to this medication and provide the prescribing physician with feedback at the end of the month regarding its efficacy. He may also benefit from organizational skills related training. Strongly encourage that he actively had participated in psychotherapy to assist with anxiety, depression, and some interpersonal dynamics related concerns. Psychiatric treatment for mood is also recommended as a supplement to the more important engagement in psychotherapy. With treatment, his prognosis improves. No extensive baseline neurocognitive and psychologic data on him. Follow-up as needed. Clinical correlation is, of course, indicated. I will discuss these findings with the patient when he follows up with me in the near future. DIAGNOSES: ADHD, Inattentive, Moderate to Severe    Anxiety-  Moderate    Depression - Mild       The above information is based upon information currently available to me. If there is any additional information of which I am currently unaware, I would be more than happy to review it upon having it made available to me. Thank you for the opportunity to see this interesting individual.     Sincerely,       Aniya Walker.  Alysha Thomas, EdS,LCP    Appendix: IQ Test results (see media section of this EMR)    Cc: Holsinger, Ernesta Hammans, AMELIE     Time Documentation:      67712 x 1 51262*5 Test administration/data gathering by Neuropsychologist (see above), 60 minutes  96138 x 1 Test administration, data gathering by technician (1st 30 minutes), 30 minutes  96139 x 5 Test administration, data gathering by technician (each additional 30 minutes), 3 hours (total tech 3 hours)   96130 x 1 Testing Evaluation Services By Neuropsychologist, 1st hour  59589 x 1 Testing Evaluation Services by Neuropsychologist, 2nd hour (45 minutes)  This includes review of referral question, reviewing records, planning test battery (50 minutes prior to testing date), and interpreting data (30 minutes), and interpretation and report writing (50 minutes)       Anticipated Integrated Feedback (70102) - Service to be completed on a future date and not currently billed. The above includes: Record review. Review of history provided by patient. Review of collaborative information. Testing by Clinician. Review of raw data. Scoring. Report writing of individual tests administered by Clinician. Integration of individual tests administered by psychometrist with NSE/testing by clinician, review of records/history/collaborative information, case Conceptualization, treatment planning, clinical decision making, report writing, coordination Of Care. Psychometry test codes as time spent by psychometrist administering and scoring neurocognitive/psychological tests under supervision of neuropsychologist.  Integral services including scoring of raw data, data interpretation, case conceptualization, report writing etcetera were initiated after the patient finished testing/raw data collected and was completed on the date the report was signed.

## 2022-03-18 PROBLEM — M79.642 LEFT HAND PAIN: Status: ACTIVE | Noted: 2017-10-16

## 2023-05-19 ENCOUNTER — TELEPHONE (OUTPATIENT)
Age: 43
End: 2023-05-19

## 2023-09-18 ENCOUNTER — TELEPHONE (OUTPATIENT)
Age: 43
End: 2023-09-18

## 2023-09-18 DIAGNOSIS — E11.65 TYPE 2 DIABETES MELLITUS WITH HYPERGLYCEMIA, WITHOUT LONG-TERM CURRENT USE OF INSULIN (HCC): ICD-10-CM

## 2023-09-18 NOTE — TELEPHONE ENCOUNTER
Spoke to pt's wife Leonides Flood) on PHI informed her that we needed to re-schedule the pt's VV on 9.18.23,due to the provider being out of the office. However JERI Tahmina's next available VV isn't until 10. 3.23 and her next In Office is not until 10.9.23. And we will be out of network with the pt's insurance on 10.1.23. And he need's a refill on his Jardiance. So unsure of where we can get him scheduled so that he may get a refill until he can get a new insurance?

## 2023-09-20 NOTE — TELEPHONE ENCOUNTER
Clarification that he has 6 months of Jardiance that was sent in June so has refill available at the pharmacy.

## 2023-09-22 NOTE — TELEPHONE ENCOUNTER
Two patient identifiers confirmed:  Informed the patient that he has a refill available at the pharmacy.

## 2023-12-24 DIAGNOSIS — E11.65 TYPE 2 DIABETES MELLITUS WITH HYPERGLYCEMIA, WITHOUT LONG-TERM CURRENT USE OF INSULIN (HCC): ICD-10-CM

## 2023-12-26 ENCOUNTER — PATIENT MESSAGE (OUTPATIENT)
Age: 43
End: 2023-12-26

## 2023-12-26 DIAGNOSIS — E11.65 TYPE 2 DIABETES MELLITUS WITH HYPERGLYCEMIA, WITHOUT LONG-TERM CURRENT USE OF INSULIN (HCC): ICD-10-CM

## 2023-12-26 RX ORDER — EMPAGLIFLOZIN 10 MG/1
10 TABLET, FILM COATED ORAL DAILY
Qty: 90 TABLET | Refills: 0 | OUTPATIENT
Start: 2023-12-26

## 2023-12-26 NOTE — TELEPHONE ENCOUNTER
PCP: Sue Martel, APRN - NP    Last appt: 6/13/2023   No future appointments.    Requested Prescriptions     Pending Prescriptions Disp Refills    empagliflozin (JARDIANCE) 10 MG tablet 90 tablet 1     Sig: Take 1 tablet by mouth daily         Prior labs and Blood pressures:  BP Readings from Last 3 Encounters:   06/13/23 114/72     Lab Results   Component Value Date/Time     06/13/2023 12:00 AM    K 5.0 06/13/2023 12:00 AM     06/13/2023 12:00 AM    CO2 21 06/13/2023 12:00 AM    BUN 20 06/13/2023 12:00 AM     Lab Results   Component Value Date/Time    NJQ3JIOD 7.4 06/13/2023 11:47 AM     Lab Results   Component Value Date/Time    CHOL 160 06/13/2023 12:00 AM    HDL 36 06/13/2023 12:00 AM     No results found for: \"VITD3\", \"VD3RIA\"    No results found for: \"TSH\", \"TSH2\", \"TSH3\"

## 2023-12-27 DIAGNOSIS — E11.65 TYPE 2 DIABETES MELLITUS WITH HYPERGLYCEMIA, WITHOUT LONG-TERM CURRENT USE OF INSULIN (HCC): ICD-10-CM

## 2023-12-27 NOTE — TELEPHONE ENCOUNTER
From: Marisol Martin  To: Julieta Martel  Sent: 12/26/2023 11:15 AM EST  Subject: Refill    I need a refill for my blood sugar medication if possible.

## 2024-01-23 ENCOUNTER — OFFICE VISIT (OUTPATIENT)
Age: 44
End: 2024-01-23
Payer: MEDICAID

## 2024-01-23 VITALS
BODY MASS INDEX: 32.73 KG/M2 | TEMPERATURE: 98.4 F | WEIGHT: 233.8 LBS | HEART RATE: 70 BPM | RESPIRATION RATE: 16 BRPM | HEIGHT: 71 IN | SYSTOLIC BLOOD PRESSURE: 107 MMHG | DIASTOLIC BLOOD PRESSURE: 70 MMHG | OXYGEN SATURATION: 96 %

## 2024-01-23 DIAGNOSIS — E11.65 TYPE 2 DIABETES MELLITUS WITH HYPERGLYCEMIA, WITHOUT LONG-TERM CURRENT USE OF INSULIN (HCC): Primary | ICD-10-CM

## 2024-01-23 LAB
ALBUMIN SERPL-MCNC: 4.8 G/DL (ref 4.1–5.1)
ALBUMIN/GLOB SERPL: 2.1 {RATIO} (ref 1.2–2.2)
ALP SERPL-CCNC: 77 IU/L (ref 44–121)
ALT SERPL-CCNC: 16 IU/L (ref 0–44)
AST SERPL-CCNC: 12 IU/L (ref 0–40)
BILIRUB SERPL-MCNC: 0.5 MG/DL (ref 0–1.2)
BUN SERPL-MCNC: 15 MG/DL (ref 6–24)
BUN/CREAT SERPL: 16 (ref 9–20)
CHLORIDE SERPL-SCNC: 102 MMOL/L (ref 96–106)
CO2 SERPL-SCNC: 23 MMOL/L (ref 20–29)
CREAT SERPL-MCNC: 0.91 MG/DL (ref 0.76–1.27)
EGFRCR SERPLBLD CKD-EPI 2021: 106 ML/MIN/1.73
GLOBULIN SER CALC-MCNC: 2.3 G/DL (ref 1.5–4.5)
GLUCOSE SERPL-MCNC: 103 MG/DL (ref 70–99)
HBA1C MFR BLD: 6.2 % (ref 4.8–5.6)
POTASSIUM SERPL-SCNC: 4.5 MMOL/L (ref 3.5–5.2)
PROT SERPL-MCNC: 7.1 G/DL (ref 6–8.5)
SODIUM SERPL-SCNC: 139 MMOL/L (ref 134–144)

## 2024-01-23 PROCEDURE — 99213 OFFICE O/P EST LOW 20 MIN: CPT | Performed by: NURSE PRACTITIONER

## 2024-01-23 ASSESSMENT — PATIENT HEALTH QUESTIONNAIRE - PHQ9
4. FEELING TIRED OR HAVING LITTLE ENERGY: 0
9. THOUGHTS THAT YOU WOULD BE BETTER OFF DEAD, OR OF HURTING YOURSELF: 0
10. IF YOU CHECKED OFF ANY PROBLEMS, HOW DIFFICULT HAVE THESE PROBLEMS MADE IT FOR YOU TO DO YOUR WORK, TAKE CARE OF THINGS AT HOME, OR GET ALONG WITH OTHER PEOPLE: 0
6. FEELING BAD ABOUT YOURSELF - OR THAT YOU ARE A FAILURE OR HAVE LET YOURSELF OR YOUR FAMILY DOWN: 0
SUM OF ALL RESPONSES TO PHQ QUESTIONS 1-9: 0
7. TROUBLE CONCENTRATING ON THINGS, SUCH AS READING THE NEWSPAPER OR WATCHING TELEVISION: 0
8. MOVING OR SPEAKING SO SLOWLY THAT OTHER PEOPLE COULD HAVE NOTICED. OR THE OPPOSITE, BEING SO FIGETY OR RESTLESS THAT YOU HAVE BEEN MOVING AROUND A LOT MORE THAN USUAL: 0
SUM OF ALL RESPONSES TO PHQ QUESTIONS 1-9: 0
5. POOR APPETITE OR OVEREATING: 0
SUM OF ALL RESPONSES TO PHQ QUESTIONS 1-9: 0
SUM OF ALL RESPONSES TO PHQ9 QUESTIONS 1 & 2: 0
3. TROUBLE FALLING OR STAYING ASLEEP: 0
SUM OF ALL RESPONSES TO PHQ QUESTIONS 1-9: 0
2. FEELING DOWN, DEPRESSED OR HOPELESS: 0
1. LITTLE INTEREST OR PLEASURE IN DOING THINGS: 0

## 2024-01-23 ASSESSMENT — ENCOUNTER SYMPTOMS: SHORTNESS OF BREATH: 0

## 2024-01-23 NOTE — PROGRESS NOTES
Chief Complaint   Patient presents with    Follow-up     \"Have you been to the ER, urgent care clinic since your last visit?  Hospitalized since your last visit?\"    NO    “Have you seen or consulted any other health care providers outside of [facility] since your last visit?”    NO    “Have you had a colorectal cancer screening such as a colonoscopy/FIT/Cologuard?    NO

## 2024-01-23 NOTE — PROGRESS NOTES
Assessment/Plan:     1. Type 2 diabetes mellitus with hyperglycemia, without long-term current use of insulin (HCC)   Start Atorvastatin as directed.  Follow up with ophthalmologist for routine eye exam. Continue Jardiance.  Await lab results that he did this morning at an outside [facility].     Return in about 6 months (around 7/23/2024) for Follow Up.     Discussed expected course/resolution/complications of diagnosis in detail with patient.    Medication risks/benefits/costs/interactions/alternatives discussed with patient.    Pt was given after visit summary which includes diagnoses, current medications & vitals.   Pt expressed understanding with the diagnosis and plan          Subjective:      Edvin Daniels is a 43 y.o. male who presents for had concerns including Follow-up.     Cardiovascular Review:  The patient has diabetes and obesity.  Diet and Lifestyle: generally follows a low fat low cholesterol diet, exercises regularly  Home BP Monitoring: is not measured at home.  Pertinent ROS: not taking medications regularly as instructed (never started Atorvastatin), no medication side effects noted, no TIA's, no chest pain on exertion, no dyspnea on exertion, no swelling of ankles.     Patient Active Problem List   Diagnosis    Generalized anxiety disorder    Chronic obstructive pulmonary disease (HCC)    Prediabetes    Left hand pain    Asthma    Overweight (BMI 25.0-29.9)    Anxiety and depression       Current Outpatient Medications   Medication Sig Dispense Refill    empagliflozin (JARDIANCE) 10 MG tablet Take 1 tablet by mouth daily 30 tablet 1    ARIPiprazole (ABILIFY) 5 MG tablet       traZODone (DESYREL) 50 MG tablet       buPROPion (WELLBUTRIN XL) 150 MG extended release tablet Take 1 tablet by mouth every morning      lamoTRIgine (LAMICTAL) 25 MG tablet Take 1 tablet by mouth daily      atorvastatin (LIPITOR) 20 MG tablet Take 1 tablet by mouth daily 90 tablet 1    hydrOXYzine HCl (ATARAX) 25 MG tablet

## 2024-01-24 DIAGNOSIS — E11.65 TYPE 2 DIABETES MELLITUS WITH HYPERGLYCEMIA, WITHOUT LONG-TERM CURRENT USE OF INSULIN (HCC): ICD-10-CM

## 2024-01-24 LAB
CHOLEST SERPL-MCNC: 153 MG/DL (ref 100–199)
HDLC SERPL-MCNC: 40 MG/DL
IMP & REVIEW OF LAB RESULTS: NORMAL
LDLC SERPL CALC-MCNC: 103 MG/DL (ref 0–99)
TRIGL SERPL-MCNC: 48 MG/DL (ref 0–149)
VLDLC SERPL CALC-MCNC: 10 MG/DL (ref 5–40)

## 2024-07-19 ENCOUNTER — OFFICE VISIT (OUTPATIENT)
Age: 44
End: 2024-07-19
Payer: MEDICAID

## 2024-07-19 VITALS
TEMPERATURE: 98.4 F | RESPIRATION RATE: 16 BRPM | DIASTOLIC BLOOD PRESSURE: 70 MMHG | WEIGHT: 228.6 LBS | BODY MASS INDEX: 32 KG/M2 | OXYGEN SATURATION: 97 % | HEART RATE: 65 BPM | SYSTOLIC BLOOD PRESSURE: 105 MMHG | HEIGHT: 71 IN

## 2024-07-19 DIAGNOSIS — E11.65 TYPE 2 DIABETES MELLITUS WITH HYPERGLYCEMIA, WITHOUT LONG-TERM CURRENT USE OF INSULIN (HCC): Primary | ICD-10-CM

## 2024-07-19 DIAGNOSIS — E66.9 OBESITY (BMI 30-39.9): ICD-10-CM

## 2024-07-19 LAB
HBA1C MFR BLD: 6.1 %
MICROALB/CREAT RATIO, POC: NORMAL MG/G
MICROALBUMIN URINE, POC: 30 MG/L

## 2024-07-19 PROCEDURE — 99214 OFFICE O/P EST MOD 30 MIN: CPT | Performed by: NURSE PRACTITIONER

## 2024-07-19 PROCEDURE — PBSHW AMB POC URINE, MICROALBUMIN, SEMIQUANTITATIVE: Performed by: NURSE PRACTITIONER

## 2024-07-19 PROCEDURE — PBSHW AMB POC HEMOGLOBIN A1C: Performed by: NURSE PRACTITIONER

## 2024-07-19 PROCEDURE — 82044 UR ALBUMIN SEMIQUANTITATIVE: CPT | Performed by: NURSE PRACTITIONER

## 2024-07-19 PROCEDURE — 3044F HG A1C LEVEL LT 7.0%: CPT | Performed by: NURSE PRACTITIONER

## 2024-07-19 PROCEDURE — 83036 HEMOGLOBIN GLYCOSYLATED A1C: CPT | Performed by: NURSE PRACTITIONER

## 2024-07-19 RX ORDER — SERTRALINE HCL 50 MG
TABLET ORAL
COMMUNITY

## 2024-07-19 RX ORDER — BUSPIRONE HYDROCHLORIDE 10 MG/1
10 TABLET ORAL 2 TIMES DAILY
COMMUNITY

## 2024-07-19 NOTE — PROGRESS NOTES
Chief Complaint   Patient presents with    6 Month Follow-Up     \"Have you been to the ER, urgent care clinic since your last visit?  Hospitalized since your last visit?\"    NO    “Have you seen or consulted any other health care providers outside of [facility] since your last visit?”    NO    “Have you had a colorectal cancer screening such as a colonoscopy/FIT/Cologuard?    NO    No colonoscopy on file  No cologuard on file  No FIT/FOBT on file   No flexible sigmoidoscopy on file             
Dispense Refill    Semaglutide,0.25 or 0.5MG/DOS, (OZEMPIC, 0.25 OR 0.5 MG/DOSE,) 2 MG/1.5ML SOPN Inject 0.25 mg into the skin every 7 days 2 mL 1    Insulin Pen Needle ([employer] PEN NEEDLES) 31G X 6 MM MISC 1 each by Does not apply route daily 100 each 3    busPIRone (BUSPAR) 10 MG tablet Take 1 tablet by mouth 2 times daily      ZOLOFT 50 MG tablet Take by mouth      empagliflozin (JARDIANCE) 25 MG tablet Take 1 tablet by mouth daily 90 tablet 3    ARIPiprazole (ABILIFY) 5 MG tablet       buPROPion (WELLBUTRIN XL) 150 MG extended release tablet Take 1 tablet by mouth every morning      hydrOXYzine HCl (ATARAX) 25 MG tablet Take by mouth 3 times daily as needed      lamoTRIgine (LAMICTAL) 25 MG tablet Take 1 tablet by mouth daily      atorvastatin (LIPITOR) 20 MG tablet Take 1 tablet by mouth daily 90 tablet 1    traZODone (DESYREL) 50 MG tablet  (Patient not taking: Reported on 7/19/2024)       No current facility-administered medications for this visit.       Allergies   Allergen Reactions    Iodinated Contrast Media Hives     abruptly got multiple hives all over body     Other     Iopamidol Rash       ROS:   Review of Systems   Constitutional:  Negative for fatigue.   Respiratory:  Negative for shortness of breath.    Cardiovascular:  Negative for chest pain and leg swelling.         Objective:   /70 (Site: Left Upper Arm, Position: Sitting, Cuff Size: Large Adult)   Pulse 65   Temp 98.4 °F (36.9 °C)   Resp 16   Ht 1.803 m (5' 11\")   Wt 103.7 kg (228 lb 9.6 oz)   SpO2 97%   BMI 31.88 kg/m²     Vitals and Nurse Documentation reviewed.     Physical Exam  Constitutional:       Appearance: Normal appearance. He is obese.   Cardiovascular:      Rate and Rhythm: Normal rate.      Heart sounds: No murmur heard.     No friction rub. No gallop.   Pulmonary:      Effort: Pulmonary effort is normal.      Breath sounds: Normal breath sounds.   Neurological:      Mental Status: He is alert.   Psychiatric:

## 2024-07-19 NOTE — PATIENT INSTRUCTIONS
Smith Sanders  [address] [facility] [address] [place] [id number] Loc/POS:                Phone:   [phone]        Arron Pradhan  [address] [place] [id number] Loc/POS:                Phone:   [phone]

## 2024-07-24 RX ORDER — SEMAGLUTIDE 1.34 MG/ML
0.25 INJECTION, SOLUTION SUBCUTANEOUS
Qty: 2 ML | Refills: 1 | Status: SHIPPED | OUTPATIENT
Start: 2024-07-24

## 2024-07-24 RX ORDER — PEN NEEDLE, DIABETIC 31 G X1/4"
1 NEEDLE, DISPOSABLE MISCELLANEOUS DAILY
Qty: 100 EACH | Refills: 3 | Status: SHIPPED | OUTPATIENT
Start: 2024-07-24

## 2024-07-29 ENCOUNTER — PATIENT MESSAGE (OUTPATIENT)
Age: 44
End: 2024-07-29

## 2024-07-29 ENCOUNTER — TELEPHONE (OUTPATIENT)
Age: 44
End: 2024-07-29

## 2024-07-29 DIAGNOSIS — E11.65 TYPE 2 DIABETES MELLITUS WITH HYPERGLYCEMIA, WITHOUT LONG-TERM CURRENT USE OF INSULIN (HCC): Primary | ICD-10-CM

## 2024-07-30 RX ORDER — PEN NEEDLE, DIABETIC 31 G X1/4"
1 NEEDLE, DISPOSABLE MISCELLANEOUS
Qty: 100 EACH | Refills: 3 | Status: SHIPPED | OUTPATIENT
Start: 2024-07-30

## 2024-07-30 NOTE — TELEPHONE ENCOUNTER
From: DESTINEE REED  To: Edvin Daniels  Sent: 7/29/2024 4:25 PM EDT  Subject: Semaglutide,0.25 or 0.5MG/DOS, (OZEMPIC, 0.25 OR 0.5 MG/DOSE,) 2 MG/1.5ML SOPN    Hello!           This message is to inform you that your insurance company has denied the prior authorization for Ozempic.                Thank You,  MARCELLA Atkins

## 2024-10-15 ENCOUNTER — OFFICE VISIT (OUTPATIENT)
Age: 44
End: 2024-10-15
Payer: MEDICAID

## 2024-10-15 VITALS
BODY MASS INDEX: 32.87 KG/M2 | OXYGEN SATURATION: 95 % | HEART RATE: 82 BPM | WEIGHT: 234.8 LBS | RESPIRATION RATE: 16 BRPM | DIASTOLIC BLOOD PRESSURE: 67 MMHG | SYSTOLIC BLOOD PRESSURE: 112 MMHG | TEMPERATURE: 98.2 F | HEIGHT: 71 IN

## 2024-10-15 DIAGNOSIS — E11.65 TYPE 2 DIABETES MELLITUS WITH HYPERGLYCEMIA, WITHOUT LONG-TERM CURRENT USE OF INSULIN (HCC): Primary | ICD-10-CM

## 2024-10-15 PROCEDURE — 3044F HG A1C LEVEL LT 7.0%: CPT | Performed by: NURSE PRACTITIONER

## 2024-10-15 PROCEDURE — 99213 OFFICE O/P EST LOW 20 MIN: CPT | Performed by: NURSE PRACTITIONER

## 2024-10-15 NOTE — PATIENT INSTRUCTIONS
Fill Atorvastatin at pharmacy  Do not take Trulicity on 12/3 in preparation for colonoscopy the next week.    This month increase trulicity to 1.5mg  4. Next month increase to 3mg

## 2024-10-15 NOTE — PROGRESS NOTES
Chief Complaint   Patient presents with    3 Month Follow-Up     \"Have you been to the ER, urgent care clinic since your last visit?  Hospitalized since your last visit?\"    NO    “Have you seen or consulted any other health care providers outside of [facility] since your last visit?”    NO    “Have you had a colorectal cancer screening such as a colonoscopy/FIT/Cologuard?    NO    No colonoscopy on file  No cologuard on file  No FIT/FOBT on file   No flexible sigmoidoscopy on file

## 2024-10-15 NOTE — PROGRESS NOTES
Assessment/Plan:     1. Type 2 diabetes mellitus with hyperglycemia, without long-term current use of insulin (HCC)  -     dulaglutide (TRULICITY) 1.5 MG/0.5ML SC injection; Inject 0.5 mLs into the skin once a week, Disp-2 mL, R-0Normal  -     HM DIABETES FOOT EXAM   Increase trulicity as directed.  Start statin therapy.     Return in about 3 months (around 1/15/2025) for Follow Up.     Discussed expected course/resolution/complications of diagnosis in detail with patient.    Medication risks/benefits/costs/interactions/alternatives discussed with patient.    Pt was given after visit summary which includes diagnoses, current medications & vitals.   Pt expressed understanding with the diagnosis and plan          Subjective:      Edvin Daniels is a 44 y.o. male who presents for had concerns including 3 Month Follow-Up.     Cardiovascular Review:  The patient has diabetes, hyperlipidemia, and obesity.  Diet and Lifestyle: not attempting to follow a low fat, low cholesterol diet, sedentary, nonsmoker  Home BP Monitoring: is not measured at home.  Pertinent ROS: not taking medications regularly as instructed, no medication side effects noted, no TIA's, no chest pain on exertion, no dyspnea on exertion, no swelling of ankles.     He never picked up the statin from the pharmacy.     Patient Active Problem List   Diagnosis    Generalized anxiety disorder    Chronic obstructive pulmonary disease (HCC)    Prediabetes    Left hand pain    Asthma    Overweight (BMI 25.0-29.9)    Anxiety and depression       Current Outpatient Medications   Medication Sig Dispense Refill    dulaglutide (TRULICITY) 1.5 MG/0.5ML SC injection Inject 0.5 mLs into the skin once a week 2 mL 0    busPIRone (BUSPAR) 10 MG tablet Take 1 tablet by mouth 2 times daily      ZOLOFT 50 MG tablet Take by mouth      empagliflozin (JARDIANCE) 25 MG tablet Take 1 tablet by mouth daily 90 tablet 3    ARIPiprazole (ABILIFY) 5 MG tablet       lamoTRIgine (LAMICTAL) 25

## 2024-11-26 ENCOUNTER — PATIENT MESSAGE (OUTPATIENT)
Age: 44
End: 2024-11-26

## 2024-11-26 DIAGNOSIS — E11.65 TYPE 2 DIABETES MELLITUS WITH HYPERGLYCEMIA, WITHOUT LONG-TERM CURRENT USE OF INSULIN (HCC): ICD-10-CM

## 2024-11-27 RX ORDER — DULAGLUTIDE 1.5 MG/.5ML
INJECTION, SOLUTION SUBCUTANEOUS
Qty: 2 ML | Refills: 2 | OUTPATIENT
Start: 2024-11-27

## 2024-11-27 RX ORDER — DULAGLUTIDE 3 MG/.5ML
3 INJECTION, SOLUTION SUBCUTANEOUS WEEKLY
Qty: 2 ML | Refills: 3 | Status: SHIPPED | OUTPATIENT
Start: 2024-11-27

## 2024-12-07 ENCOUNTER — APPOINTMENT (OUTPATIENT)
Facility: HOSPITAL | Age: 44
End: 2024-12-07
Payer: MEDICAID

## 2024-12-07 ENCOUNTER — HOSPITAL ENCOUNTER (EMERGENCY)
Facility: HOSPITAL | Age: 44
Discharge: HOME | End: 2024-12-07
Attending: EMERGENCY MEDICINE
Payer: MEDICAID

## 2024-12-07 VITALS
TEMPERATURE: 97.5 F | WEIGHT: 236.55 LBS | RESPIRATION RATE: 16 BRPM | OXYGEN SATURATION: 96 % | SYSTOLIC BLOOD PRESSURE: 144 MMHG | HEART RATE: 98 BPM | DIASTOLIC BLOOD PRESSURE: 75 MMHG | BODY MASS INDEX: 32.99 KG/M2

## 2024-12-07 DIAGNOSIS — S90.31XA HEMATOMA OF RIGHT FOOT: Primary | ICD-10-CM

## 2024-12-07 LAB
ALBUMIN SERPL-MCNC: 4.2 G/DL (ref 3.5–5)
ALBUMIN/GLOB SERPL: 1.2 (ref 1.1–2.2)
ALP SERPL-CCNC: 80 U/L (ref 45–117)
ALT SERPL-CCNC: 23 U/L (ref 12–78)
ANION GAP SERPL CALC-SCNC: 7 MMOL/L (ref 2–12)
AST SERPL-CCNC: 7 U/L (ref 15–37)
BASOPHILS # BLD: 0 K/UL (ref 0–0.1)
BASOPHILS NFR BLD: 0 % (ref 0–1)
BILIRUB SERPL-MCNC: 0.6 MG/DL (ref 0.2–1)
BUN SERPL-MCNC: 21 MG/DL (ref 6–20)
BUN/CREAT SERPL: 22 (ref 12–20)
CALCIUM SERPL-MCNC: 9.2 MG/DL (ref 8.5–10.1)
CHLORIDE SERPL-SCNC: 106 MMOL/L (ref 97–108)
CO2 SERPL-SCNC: 27 MMOL/L (ref 21–32)
COMMENT:: NORMAL
CREAT SERPL-MCNC: 0.94 MG/DL (ref 0.7–1.3)
DIFFERENTIAL METHOD BLD: ABNORMAL
EOSINOPHIL # BLD: 0.2 K/UL (ref 0–0.4)
EOSINOPHIL NFR BLD: 1 % (ref 0–7)
ERYTHROCYTE [DISTWIDTH] IN BLOOD BY AUTOMATED COUNT: 12.7 % (ref 11.5–14.5)
EST. AVERAGE GLUCOSE BLD GHB EST-MCNC: 111 MG/DL
GLOBULIN SER CALC-MCNC: 3.6 G/DL (ref 2–4)
GLUCOSE SERPL-MCNC: 115 MG/DL (ref 65–100)
HBA1C MFR BLD: 5.5 % (ref 4–5.6)
HCT VFR BLD AUTO: 46 % (ref 36.6–50.3)
HGB BLD-MCNC: 15.4 G/DL (ref 12.1–17)
IMM GRANULOCYTES # BLD AUTO: 0 K/UL (ref 0–0.04)
IMM GRANULOCYTES NFR BLD AUTO: 0 % (ref 0–0.5)
LYMPHOCYTES # BLD: 1 K/UL (ref 0.8–3.5)
LYMPHOCYTES NFR BLD: 10 % (ref 12–49)
MCH RBC QN AUTO: 30.4 PG (ref 26–34)
MCHC RBC AUTO-ENTMCNC: 33.5 G/DL (ref 30–36.5)
MCV RBC AUTO: 90.9 FL (ref 80–99)
MONOCYTES # BLD: 1 K/UL (ref 0–1)
MONOCYTES NFR BLD: 10 % (ref 5–13)
NEUTS SEG # BLD: 8.3 K/UL (ref 1.8–8)
NEUTS SEG NFR BLD: 79 % (ref 32–75)
NRBC # BLD: 0 K/UL (ref 0–0.01)
NRBC BLD-RTO: 0 PER 100 WBC
PLATELET # BLD AUTO: 253 K/UL (ref 150–400)
PMV BLD AUTO: 9.9 FL (ref 8.9–12.9)
POTASSIUM SERPL-SCNC: 4.2 MMOL/L (ref 3.5–5.1)
PROT SERPL-MCNC: 7.8 G/DL (ref 6.4–8.2)
RBC # BLD AUTO: 5.06 M/UL (ref 4.1–5.7)
SODIUM SERPL-SCNC: 140 MMOL/L (ref 136–145)
SPECIMEN HOLD: NORMAL
WBC # BLD AUTO: 10.5 K/UL (ref 4.1–11.1)

## 2024-12-07 PROCEDURE — 6370000000 HC RX 637 (ALT 250 FOR IP): Performed by: EMERGENCY MEDICINE

## 2024-12-07 PROCEDURE — 99284 EMERGENCY DEPT VISIT MOD MDM: CPT

## 2024-12-07 PROCEDURE — 73630 X-RAY EXAM OF FOOT: CPT

## 2024-12-07 PROCEDURE — 85025 COMPLETE CBC W/AUTO DIFF WBC: CPT

## 2024-12-07 PROCEDURE — 83036 HEMOGLOBIN GLYCOSYLATED A1C: CPT

## 2024-12-07 PROCEDURE — 36415 COLL VENOUS BLD VENIPUNCTURE: CPT

## 2024-12-07 PROCEDURE — 80053 COMPREHEN METABOLIC PANEL: CPT

## 2024-12-07 RX ORDER — CLINDAMYCIN HYDROCHLORIDE 300 MG/1
300 CAPSULE ORAL 4 TIMES DAILY
Qty: 40 CAPSULE | Refills: 0 | Status: ON HOLD | OUTPATIENT
Start: 2024-12-07 | End: 2024-12-11

## 2024-12-07 RX ORDER — CLINDAMYCIN HYDROCHLORIDE 150 MG/1
600 CAPSULE ORAL
Status: COMPLETED | OUTPATIENT
Start: 2024-12-07 | End: 2024-12-07

## 2024-12-07 RX ORDER — MUPIROCIN 20 MG/G
OINTMENT TOPICAL
Qty: 1 G | Refills: 0 | Status: SHIPPED | OUTPATIENT
Start: 2024-12-07 | End: 2024-12-14

## 2024-12-07 RX ADMIN — CLINDAMYCIN HYDROCHLORIDE 600 MG: 150 CAPSULE ORAL at 12:44

## 2024-12-07 ASSESSMENT — ENCOUNTER SYMPTOMS
VOMITING: 0
NAUSEA: 0
TROUBLE SWALLOWING: 0
COUGH: 0
ABDOMINAL PAIN: 0
DIARRHEA: 0
COLOR CHANGE: 1
SHORTNESS OF BREATH: 0

## 2024-12-07 NOTE — DISCHARGE INSTRUCTIONS
Please elevate your right foot when able.  May soak in warm water and baking soda or Epson salts twice daily.  Thoroughly dry feet after soaking.  Take antibiotics as prescribed.  FOLLOW UP IF ANY INCREASE IN REDNESS OR STREAKING OF REDNESS AS DISCUSSED.  FINISH ANTIBIOTICS AS PRESCRIBED.

## 2024-12-07 NOTE — ED PROVIDER NOTES
[facility] EMERGENCY DEP  EMERGENCY DEPARTMENT ENCOUNTER      Pt Name: Edvin Daniels  MRN: [id number]  Birthdate 1980  Date of evaluation: 12/7/2024  Provider: KELSY Chahal NP    CHIEF COMPLAINT       Chief Complaint   Patient presents with    Wound Check         HISTORY OF PRESENT ILLNESS   (Location/Symptom, Timing/Onset, Context/Setting, Quality, Duration, Modifying Factors, Severity)  Note limiting factors.   HPI  Patient is a 44-year-old male with past medical history significant for asthma, type 2 diabetes, COPD, anxiety disorder and obesity who presents to the ED with a tender spot on the volar aspect of the right foot since yesterday.  He states that he works at [employer] and stands for prolonged periods of time at his job.  He denies any falls, direct injury or blunt trauma.  There is a localized hematoma below the right fourth toe consistent with a pressure spot.  No extending erythema, no drainage or bleeding.  Good neurovascular sensation pain increases with palpation and weightbearing.  He has not had any medications today prior to arrival.    Review of External Medical Records:     Nursing Notes were reviewed.    REVIEW OF SYSTEMS    (2-9 systems for level 4, 10 or more for level 5)     Review of Systems   Constitutional:  Negative for activity change, appetite change, fever and unexpected weight change.   HENT:  Negative for congestion and trouble swallowing.    Eyes:  Negative for visual disturbance.   Respiratory:  Negative for cough and shortness of breath.    Cardiovascular:  Negative for chest pain, palpitations and leg swelling.   Gastrointestinal:  Negative for abdominal pain, diarrhea, nausea and vomiting.   Genitourinary:  Negative for dysuria.   Musculoskeletal:  Positive for gait problem.   Skin:  Positive for color change.   Neurological:  Negative for headaches.   All other systems reviewed and are negative.      Except as noted above the remainder of the review of systems was         General: Swelling and tenderness present.      Cervical back: Normal range of motion and neck supple. No tenderness.      Comments: Small pressure hematoma noted on the right volar foot over the right fourth metatarsal; Skin integrity is intact. There is no obvious bony or soft tissue deformity. Good neurovascular sensation. No apparent tendon or nerve injury.  No open lesions, drainage or bleeding.     Lymphadenopathy:      Cervical: No cervical adenopathy.   Skin:     General: Skin is warm and dry.      Findings: No bruising, erythema, lesion or rash.   Neurological:      Mental Status: He is alert and oriented to person, place, and time.         DIAGNOSTIC RESULTS     EKG: All EKG's are interpreted by the Emergency Department Physician who either signs or Co-signs this chart in the absence of a cardiologist.        RADIOLOGY:   Non-plain film images such as CT, Ultrasound and MRI are read by the radiologist. Plain radiographic images are visualized and preliminarily interpreted by the emergency physician with the below findings:        Interpretation per the Radiologist below, if available at the time of this note:    XR FOOT RIGHT (MIN 3 VIEWS)   Final Result   No acute abnormality.      Electronically signed by MAGNOLIA SORIA           LABS:  Labs Reviewed   CBC WITH AUTO DIFFERENTIAL - Abnormal; Notable for the following components:       Result Value    Neutrophils % 79 (*)     Lymphocytes % 10 (*)     Neutrophils Absolute 8.3 (*)     All other components within normal limits   COMPREHENSIVE METABOLIC PANEL - Abnormal; Notable for the following components:    Glucose 115 (*)     BUN 21 (*)     BUN/Creatinine Ratio 22 (*)     AST 7 (*)     All other components within normal limits   HEMOGLOBIN A1C   EXTRA TUBES HOLD       All other labs were within normal range or not returned as of this dictation.    EMERGENCY DEPARTMENT COURSE and DIFFERENTIAL DIAGNOSIS/MDM:   Vitals:    Vitals:    12/07/24 1151   BP:

## 2024-12-10 ENCOUNTER — OFFICE VISIT (OUTPATIENT)
Age: 44
End: 2024-12-10
Payer: MEDICAID

## 2024-12-10 VITALS
WEIGHT: 231 LBS | HEART RATE: 71 BPM | TEMPERATURE: 98.4 F | OXYGEN SATURATION: 98 % | HEIGHT: 71 IN | RESPIRATION RATE: 12 BRPM | BODY MASS INDEX: 32.34 KG/M2 | DIASTOLIC BLOOD PRESSURE: 73 MMHG | SYSTOLIC BLOOD PRESSURE: 124 MMHG

## 2024-12-10 DIAGNOSIS — L08.9 RIGHT FOOT INFECTION: Primary | ICD-10-CM

## 2024-12-10 PROCEDURE — 99214 OFFICE O/P EST MOD 30 MIN: CPT | Performed by: NURSE PRACTITIONER

## 2024-12-10 NOTE — PROGRESS NOTES
Chief Complaint   Patient presents with    Foot Injury     Crack on foot developed into a spot that is now the size a quarter    Started Friday night ; went to ER Saturday for it         \"Have you been to the ER, urgent care clinic since your last visit?  Hospitalized since your last visit?\"    12/7 - [facility]    “Have you seen or consulted any other health care providers outside of [facility] since your last visit?”    NO            Click Here for Release of Records Request           1/23/2024    11:44 AM   PHQ-9    Little interest or pleasure in doing things 0   Feeling down, depressed, or hopeless 0   Trouble falling or staying asleep, or sleeping too much 0   Feeling tired or having little energy 0   Poor appetite or overeating 0   Feeling bad about yourself - or that you are a failure or have let yourself or your family down 0   Trouble concentrating on things, such as reading the newspaper or watching television 0   Moving or speaking so slowly that other people could have noticed. Or the opposite - being so fidgety or restless that you have been moving around a lot more than usual 0   Thoughts that you would be better off dead, or of hurting yourself in some way 0   PHQ-2 Score 0   PHQ-9 Total Score 0   If you checked off any problems, how difficult have these problems made it for you to do your work, take care of things at home, or get along with other people? 0           Financial Resource Strain: Low Risk  (7/19/2024)    Overall Financial Resource Strain (CARDIA)     Difficulty of Paying Living Expenses: Not very hard      Food Insecurity: No Food Insecurity (7/19/2024)    Hunger Vital Sign     Worried About Running Out of Food in the Last Year: Never true     Ran Out of Food in the Last Year: Never true          Health Maintenance Due   Topic Date Due    Diabetic retinal exam  Never done    Hepatitis B vaccine (1 of 3 - 19+ 3-dose series) Never done    Colorectal Cancer Screen  Never done

## 2024-12-11 ENCOUNTER — ANESTHESIA (OUTPATIENT)
Facility: HOSPITAL | Age: 44
End: 2024-12-11
Payer: MEDICAID

## 2024-12-11 ENCOUNTER — ANESTHESIA EVENT (OUTPATIENT)
Facility: HOSPITAL | Age: 44
End: 2024-12-11
Payer: MEDICAID

## 2024-12-11 ENCOUNTER — HOSPITAL ENCOUNTER (OUTPATIENT)
Facility: HOSPITAL | Age: 44
Setting detail: OUTPATIENT SURGERY
Discharge: HOME | End: 2024-12-11
Attending: INTERNAL MEDICINE | Admitting: INTERNAL MEDICINE
Payer: MEDICAID

## 2024-12-11 VITALS
SYSTOLIC BLOOD PRESSURE: 134 MMHG | RESPIRATION RATE: 17 BRPM | BODY MASS INDEX: 31.53 KG/M2 | HEIGHT: 71 IN | OXYGEN SATURATION: 96 % | TEMPERATURE: 98.4 F | WEIGHT: 225.2 LBS | DIASTOLIC BLOOD PRESSURE: 92 MMHG | HEART RATE: 83 BPM

## 2024-12-11 PROCEDURE — 3600007502: Performed by: INTERNAL MEDICINE

## 2024-12-11 PROCEDURE — 88305 TISSUE EXAM BY PATHOLOGIST: CPT

## 2024-12-11 PROCEDURE — 6360000002 HC RX W HCPCS: Performed by: NURSE ANESTHETIST, CERTIFIED REGISTERED

## 2024-12-11 PROCEDURE — 3600007512: Performed by: INTERNAL MEDICINE

## 2024-12-11 PROCEDURE — 7100000010 HC PHASE II RECOVERY - FIRST 15 MIN: Performed by: INTERNAL MEDICINE

## 2024-12-11 PROCEDURE — 7100000011 HC PHASE II RECOVERY - ADDTL 15 MIN: Performed by: INTERNAL MEDICINE

## 2024-12-11 PROCEDURE — 2580000003 HC RX 258: Performed by: NURSE ANESTHETIST, CERTIFIED REGISTERED

## 2024-12-11 PROCEDURE — 3700000000 HC ANESTHESIA ATTENDED CARE: Performed by: INTERNAL MEDICINE

## 2024-12-11 PROCEDURE — 3700000001 HC ADD 15 MINUTES (ANESTHESIA): Performed by: INTERNAL MEDICINE

## 2024-12-11 PROCEDURE — 2709999900 HC NON-CHARGEABLE SUPPLY: Performed by: INTERNAL MEDICINE

## 2024-12-11 RX ORDER — SODIUM CHLORIDE 9 MG/ML
INJECTION, SOLUTION INTRAVENOUS CONTINUOUS
Status: DISCONTINUED | OUTPATIENT
Start: 2024-12-11 | End: 2024-12-11 | Stop reason: HOSPADM

## 2024-12-11 RX ORDER — SODIUM CHLORIDE 9 MG/ML
INJECTION, SOLUTION INTRAVENOUS PRN
Status: DISCONTINUED | OUTPATIENT
Start: 2024-12-11 | End: 2024-12-11 | Stop reason: HOSPADM

## 2024-12-11 RX ORDER — SODIUM CHLORIDE 0.9 % (FLUSH) 0.9 %
5-40 SYRINGE (ML) INJECTION PRN
Status: DISCONTINUED | OUTPATIENT
Start: 2024-12-11 | End: 2024-12-11 | Stop reason: HOSPADM

## 2024-12-11 RX ORDER — LIDOCAINE HYDROCHLORIDE 20 MG/ML
INJECTION, SOLUTION EPIDURAL; INFILTRATION; INTRACAUDAL; PERINEURAL
Status: DISCONTINUED | OUTPATIENT
Start: 2024-12-11 | End: 2024-12-11 | Stop reason: SDUPTHER

## 2024-12-11 RX ORDER — SODIUM CHLORIDE 9 MG/ML
INJECTION, SOLUTION INTRAVENOUS
Status: DISCONTINUED | OUTPATIENT
Start: 2024-12-11 | End: 2024-12-11 | Stop reason: SDUPTHER

## 2024-12-11 RX ORDER — SODIUM CHLORIDE 0.9 % (FLUSH) 0.9 %
5-40 SYRINGE (ML) INJECTION EVERY 12 HOURS SCHEDULED
Status: DISCONTINUED | OUTPATIENT
Start: 2024-12-11 | End: 2024-12-11 | Stop reason: HOSPADM

## 2024-12-11 RX ADMIN — PROPOFOL 50 MG: 10 INJECTION, EMULSION INTRAVENOUS at 09:16

## 2024-12-11 RX ADMIN — LIDOCAINE HYDROCHLORIDE 20 MG: 20 INJECTION, SOLUTION EPIDURAL; INFILTRATION; INTRACAUDAL; PERINEURAL at 09:14

## 2024-12-11 RX ADMIN — PROPOFOL 200 MG: 10 INJECTION, EMULSION INTRAVENOUS at 09:14

## 2024-12-11 RX ADMIN — SODIUM CHLORIDE: 9 INJECTION, SOLUTION INTRAVENOUS at 09:12

## 2024-12-11 RX ADMIN — PROPOFOL 50 MG: 10 INJECTION, EMULSION INTRAVENOUS at 09:18

## 2024-12-11 ASSESSMENT — PAIN - FUNCTIONAL ASSESSMENT: PAIN_FUNCTIONAL_ASSESSMENT: 0-10

## 2024-12-11 ASSESSMENT — ENCOUNTER SYMPTOMS: SHORTNESS OF BREATH: 0

## 2024-12-11 NOTE — H&P
44 y.o. male presents for open access colonoscopy for screening.  Additional H&P data will be attached on the day of procedure.    Ranjit Rodriguez Jr, MD

## 2024-12-11 NOTE — PROGRESS NOTES
Verified patient name and date of birth, scheduled procedure, and informed consent. Reviewed general discharge instructions and  information.  Assessed patient. Awake, alert, and oriented per baseline. Vital signs stable (see vital sign flowsheet). Respiratory status within defined limits, abdomen soft and non tender. Skin with in defined limits.     Initial RN admission and assessment performed and documented in Endoscopy navigator.     Patient evaluated by anesthesia in pre-procedure holding.     All procedural vital signs, airway assessment, and level of consciousness information monitored and recorded by anesthesia staff on the anesthesia record.     Report received from CRNA post procedure.  Patient transported to recovery area by RN.    Endoscopy post procedure time out was performed and specimens were verified with physician.    Endoscope was pre-cleaned at bedside immediately following procedure by Dale

## 2024-12-11 NOTE — PROGRESS NOTES
Endoscopy recovery  Patient returned to baseline, vital signs stable (see vital sign flowsheet). Patient offered liquids and tolerated well. Respiratory status within defined limits. Abdomen soft not tender. Skin with in defined limits. Responsible party driving patient home was given the opportunity to ask questions. Patient discharged with documented belongings. Discharge instructions reviewed and print out given.  Patient verbalized understanding.     Cleared by anesthesia to discharge. Patient stated he felt \"junky\" but was prior to procedure. Aware to seek medical attention if develops s/sx of aspiration PNA which anesesthia discussed and he understood. Patient did have a prior black eye and bleeding/busted lip. Pt was not concerned of the lip.

## 2024-12-11 NOTE — OP NOTE
[facility]  [facility]  ARTEM Rodriguez Jr, MD  [phone]      2024    Colonoscopy Procedure Note  Edvin Daniels  :  1980  [facility] Medical Record Number: [id number]    Indications:   Average risk colon cancer screening  PCP:  Sue Martel APRN - NP  Anesthesia/Sedation: See Anesthesia Record  Endoscopist:  Dr. ARTEM Rodriguez Jr  Complications:  None  Estimated Blood Loss:  None    Surgical assistant: Circulator: Torrie Babin RN  Endoscopy Technician: Baltazar Quiroga none unless otherwise specified.     Permit:  The indications, risks, benefits and alternatives were reviewed with the patient or their decision maker who was provided an opportunity to ask questions and all questions were answered.  The specific risks of colonoscopy with conscious sedation were reviewed, including but not limited to anesthetic complication, bleeding, adverse drug reaction, missed lesion, infection, IV site reactions, and intestinal perforation which would lead to the need for surgical repair.  Alternatives to colonoscopy including radiographic imaging, observation without testing, or laboratory testing were reviewed including the limitations of those alternatives.  After considering the options and having all their questions answered, the patient or their decision maker provided both verbal and written consent to proceed.        Procedure in Detail:  After obtaining informed consent, positioning of the patient in the left lateral decubitus position, and conduction of a pre-procedure pause or \"time out\" the endoscope was introduced into the anus and advanced to the cecum, which was identified by the ileocecal valve and appendiceal orifice.  The quality of the colonic preparation was fair.  A careful inspection was made as the colonoscope was withdrawn, findings and interventions are described

## 2024-12-11 NOTE — INTERVAL H&P NOTE
Pre-Endoscopy H&P Update  Chief complaint/HPI/ROS:  The indication for the procedure, the patient's history and the patient's current medications are reviewed prior to the procedure and that data is reported on the H&P to which this document is attached.  Any significant complaints with regard to organ systems will be noted, and if not mentioned then a review of systems is not contributory.  Past Medical History:   Diagnosis Date    Asthma     Cellulitis and abscess of leg 2015    Chronic obstructive pulmonary disease (HCC)     had collapsed lung and pna    Generalized anxiety disorder 2015    Hypokalemia 2015    Left hand pain 10/16/2017    Obesity (BMI 30-39.9) 2015    Pneumomediastinum (HCC) 2012    Prediabetes 2015    6.0    Vomiting 09/10/2015     Khurram Fox-EGD&gstric emptying planned- seeing Dr. Luda Vick- next 2017      No past surgical history on file.  Social   Social History     Tobacco Use    Smoking status: Former     Current packs/day: 0.00     Types: Cigarettes     Quit date: 10/23/2013     Years since quittin.1    Smokeless tobacco: Never   Substance Use Topics    Alcohol use: Not Currently      Family History   Problem Relation Age of Onset    Cancer Maternal Uncle     Cancer Paternal Uncle     Diabetes Maternal Uncle     Diabetes Father     Cancer Mother         Uterine    Cancer Maternal Grandfather         leukemia    Kidney Disease Maternal Uncle         on dialysis    Diabetes Maternal Uncle       Allergies   Allergen Reactions    Iodinated Contrast Media Hives     abruptly got multiple hives all over body     Other     Iopamidol Rash      Prior to Admission Medications   Prescriptions Last Dose Informant Patient Reported? Taking?   ARIPiprazole (ABILIFY) 5 MG tablet   Yes No   Dulaglutide (TRULICITY) 3 MG/0.5ML SOAJ   No No   Sig: Inject 3 mg into the skin once a week   ZOLOFT 50 MG tablet   Yes No   Sig: Take by mouth   busPIRone (BUSPAR) 10 MG tablet

## 2024-12-11 NOTE — DISCHARGE INSTRUCTIONS
[facility]  [facility]  ARTEM Ivey Jr, MD  [phone]      December 11, 2024    Edvin Daniels  YOB: 1980    COLONOSCOPY DISCHARGE INSTRUCTIONS    If there is redness at IV site you should apply warm compress to area.  If redness or soreness persist contact Dr. Ivey's office or your primary care doctor.    There may be a slight amount of blood passed from the rectum.  Gaseous discomfort may develop, but walking, belching will help relieve this.  You may not operate a vehicle for 12 hours  You may not operate machinery or dangerous appliances for rest of today  You may not drink alcoholic beverages for 12 hours  Avoid making any critical decisions for 24 hours    DIET:  You may resume your normal diet, but some patients find that heavy or large meals may lead to indigestion or vomiting.  I suggest a light meal as first food intake.    MEDICATIONS:  The use of some over-the-counter pain medication may lead to bleeding after colon biopsies or polyp removal.  Tylenol (also called acetaminophen) is safe to take even if you have had colonoscopy with polyp removal.  Based on the procedure you had today you may safely take aspirin or aspirin-like products for the next seven (7) days.  Remember that Tylenol (also called acetaminophen) is safe to take after colonoscopy even if you have had biopsies or polyps removed.    ACTIVITY:  You may resume your normal household activities, but it is recommended that you spend the remainder of the day resting -  avoid any strenuous activity.    CALL DR. IVEY'S OFFICE IF:  Increasing pain, nausea, vomiting  Abdominal distension (swelling)  Significant new or increased bleeding (oral or rectal)  Fever/Chills  Chest pain/shortness of breath                       Additional instructions:   Impression:  Sigmoid colon polyp x 1, removed  Mild left-sided

## 2024-12-11 NOTE — PROGRESS NOTES
Assessment/Plan:     1. Right foot infection   I&D perfomed in office today producing purulent and serosanguinous fluid.  Symptoms are improved after procedure.  He will keep clean with dry bandage.  Continue antibiotics until complete.  Return if symptoms worsen.  Given work excuse for the next few days.     Time: 30-39 minutes was spent with this patient face to face discussing  diagnoses, risk factors and treatment with greater than 50% of this time was spent in counseling and coordination of care.      Return if symptoms worsen or fail to improve.     Discussed expected course/resolution/complications of diagnosis in detail with patient.    Medication risks/benefits/costs/interactions/alternatives discussed with patient.    Pt was given after visit summary which includes diagnoses, current medications & vitals.   Pt expressed understanding with the diagnosis and plan          Subjective:      Edvin Daniels is a 44 y.o. male who presents for had concerns including Foot Injury (Crack on foot developed into a spot that is now the size a quarter//Started Friday night ; went to ER Saturday for it).     He presents for evaluation of a foot infection on the right foot.  Went to the ER over the weekend and diagnosed with hematoma, likely from prolonged standing and walking.  Symptoms have worsened and he is accompanied by his partner today who is concerned about associated swelling and redness.  He is taking antibiotic therapy as recommended.      Patient Active Problem List   Diagnosis    Generalized anxiety disorder    Chronic obstructive pulmonary disease (HCC)    Prediabetes    Left hand pain    Asthma    Overweight (BMI 25.0-29.9)    Anxiety and depression       Current Outpatient Medications   Medication Sig Dispense Refill    clindamycin (CLEOCIN) 300 MG capsule Take 1 capsule by mouth 4 times daily for 10 days 40 capsule 0    mupirocin (BACTROBAN) 2 % ointment Apply topically 2 times daily. 1 g 0    Dulaglutide

## 2024-12-11 NOTE — ANESTHESIA POSTPROCEDURE EVALUATION
Department of Anesthesiology  Postprocedure Note    Patient: Edvin Daniels  MRN: [id number]  YOB: 1980  Date of evaluation: 12/11/2024    Procedure Summary       Date: 12/11/24 Room / Location: [facility] ENDO 02 / [facility] ENDOSCOPY    Anesthesia Start: 0912 Anesthesia Stop: 0941    Procedure: COLONOSCOPY (Lower GI Region) Diagnosis:       Colon cancer screening      (Colon cancer screening [Z12.11])    Surgeons: Ranjit Rodriguez MD Responsible Provider: Errol Coto MD    Anesthesia Type: MAC ASA Status: 3            Anesthesia Type: No value filed.    Jose Phase I: Jose Score: 10    Jose Phase II: Jose Score: 10    Anesthesia Post Evaluation    Patient location during evaluation: PACU  Patient participation: complete - patient participated  Level of consciousness: awake and alert  Airway patency: patent  Nausea & Vomiting: no nausea and no vomiting  Cardiovascular status: hemodynamically stable  Respiratory status: acceptable  Hydration status: stable  Comments: Pt vomited during the procedure. Initially had some wheezing, but this has since then improved. Pt states that he feels fine. He was informed that he may have aspirated and to return to the ED or PCP if he develops shortness of breath or a fever.     No notable events documented.

## 2024-12-11 NOTE — ANESTHESIA PRE PROCEDURE
Smoking: 10/23/13       Cardiovascular:Negative CV ROS            Rhythm: regular  Rate: normal                    Neuro/Psych:   (+) psychiatric history:depression/anxiety             GI/Hepatic/Renal:             Endo/Other:    (+) DiabetesType II DM.                  ROS comment: Drug use: Marijuana (Weed); Times per week: 7  On Trulicity and Jardiance Abdominal: normal exam            Vascular: negative vascular ROS.         Other Findings:         Anesthesia Plan      MAC     ASA 3       Induction: intravenous.      Anesthetic plan and risks discussed with patient.      Plan discussed with CRNA.                  Errol Coto MD   12/11/2024

## 2025-01-27 DIAGNOSIS — E11.65 TYPE 2 DIABETES MELLITUS WITH HYPERGLYCEMIA, WITHOUT LONG-TERM CURRENT USE OF INSULIN (HCC): ICD-10-CM

## 2025-01-27 RX ORDER — DULAGLUTIDE 3 MG/.5ML
3 INJECTION, SOLUTION SUBCUTANEOUS WEEKLY
Qty: 2 ML | Refills: 3 | Status: SHIPPED | OUTPATIENT
Start: 2025-01-27

## 2025-01-27 SDOH — ECONOMIC STABILITY: FOOD INSECURITY: WITHIN THE PAST 12 MONTHS, THE FOOD YOU BOUGHT JUST DIDN'T LAST AND YOU DIDN'T HAVE MONEY TO GET MORE.: PATIENT DECLINED

## 2025-01-27 SDOH — ECONOMIC STABILITY: FOOD INSECURITY: WITHIN THE PAST 12 MONTHS, YOU WORRIED THAT YOUR FOOD WOULD RUN OUT BEFORE YOU GOT MONEY TO BUY MORE.: PATIENT DECLINED

## 2025-01-27 SDOH — ECONOMIC STABILITY: INCOME INSECURITY: IN THE LAST 12 MONTHS, WAS THERE A TIME WHEN YOU WERE NOT ABLE TO PAY THE MORTGAGE OR RENT ON TIME?: PATIENT DECLINED

## 2025-01-27 SDOH — ECONOMIC STABILITY: TRANSPORTATION INSECURITY
IN THE PAST 12 MONTHS, HAS LACK OF TRANSPORTATION KEPT YOU FROM MEETINGS, WORK, OR FROM GETTING THINGS NEEDED FOR DAILY LIVING?: PATIENT DECLINED

## 2025-01-27 SDOH — ECONOMIC STABILITY: TRANSPORTATION INSECURITY
IN THE PAST 12 MONTHS, HAS THE LACK OF TRANSPORTATION KEPT YOU FROM MEDICAL APPOINTMENTS OR FROM GETTING MEDICATIONS?: PATIENT DECLINED

## 2025-01-27 NOTE — TELEPHONE ENCOUNTER
PCP: Sue Martel APRN - NP    Last appt: 12/10/2024     Future Appointments   Date Time Provider Department Center   2/13/2025  1:00 PM Sue Martel APRN - NP [facility] ECC DEP       Requested Prescriptions      No prescriptions requested or ordered in this encounter       Prior labs and Blood pressures:  BP Readings from Last 3 Encounters:   12/11/24 (!) 134/92   12/10/24 124/73   12/07/24 (!) 144/75     Lab Results   Component Value Date/Time     12/07/2024 12:06 PM    K 4.2 12/07/2024 12:06 PM     12/07/2024 12:06 PM    CO2 27 12/07/2024 12:06 PM    BUN 21 12/07/2024 12:06 PM     Lab Results   Component Value Date/Time    EYA0XBKK 6.1 07/19/2024 11:59 AM     Lab Results   Component Value Date/Time    CHOL 153 01/23/2024 09:21 AM    HDL 40 01/23/2024 09:21 AM     01/23/2024 09:21 AM    VLDL 10 01/23/2024 09:21 AM     No results found for: \"VITD3\"    No results found for: \"TSH\", \"TSH2\", \"TSH3\"

## 2025-01-27 NOTE — TELEPHONE ENCOUNTER
Spoke to pt girlfriend (PHI verified) and inform her pt appt 1/30 need to be brenda due to NP Tahmina out. Pt brenda to 2/13. Girlfriend said pt needs refill for jardiance and trulicity enough to last until visit.-TM 1/27/25

## 2025-02-10 ENCOUNTER — PATIENT MESSAGE (OUTPATIENT)
Age: 45
End: 2025-02-10

## 2025-02-10 DIAGNOSIS — E11.65 TYPE 2 DIABETES MELLITUS WITH HYPERGLYCEMIA, WITHOUT LONG-TERM CURRENT USE OF INSULIN (HCC): Primary | ICD-10-CM

## 2025-02-10 SDOH — ECONOMIC STABILITY: FOOD INSECURITY: WITHIN THE PAST 12 MONTHS, YOU WORRIED THAT YOUR FOOD WOULD RUN OUT BEFORE YOU GOT MONEY TO BUY MORE.: NEVER TRUE

## 2025-02-10 SDOH — ECONOMIC STABILITY: FOOD INSECURITY: WITHIN THE PAST 12 MONTHS, THE FOOD YOU BOUGHT JUST DIDN'T LAST AND YOU DIDN'T HAVE MONEY TO GET MORE.: NEVER TRUE

## 2025-02-10 SDOH — ECONOMIC STABILITY: INCOME INSECURITY: IN THE LAST 12 MONTHS, WAS THERE A TIME WHEN YOU WERE NOT ABLE TO PAY THE MORTGAGE OR RENT ON TIME?: NO

## 2025-02-10 SDOH — ECONOMIC STABILITY: TRANSPORTATION INSECURITY
IN THE PAST 12 MONTHS, HAS LACK OF TRANSPORTATION KEPT YOU FROM MEETINGS, WORK, OR FROM GETTING THINGS NEEDED FOR DAILY LIVING?: NO

## 2025-02-10 SDOH — ECONOMIC STABILITY: TRANSPORTATION INSECURITY
IN THE PAST 12 MONTHS, HAS THE LACK OF TRANSPORTATION KEPT YOU FROM MEDICAL APPOINTMENTS OR FROM GETTING MEDICATIONS?: NO

## 2025-02-13 ENCOUNTER — OFFICE VISIT (OUTPATIENT)
Age: 45
End: 2025-02-13
Payer: MEDICAID

## 2025-02-13 VITALS
HEIGHT: 71 IN | HEART RATE: 84 BPM | BODY MASS INDEX: 31.11 KG/M2 | DIASTOLIC BLOOD PRESSURE: 82 MMHG | WEIGHT: 222.2 LBS | TEMPERATURE: 98 F | SYSTOLIC BLOOD PRESSURE: 122 MMHG | OXYGEN SATURATION: 97 % | RESPIRATION RATE: 17 BRPM

## 2025-02-13 DIAGNOSIS — E11.65 TYPE 2 DIABETES MELLITUS WITH HYPERGLYCEMIA, WITHOUT LONG-TERM CURRENT USE OF INSULIN (HCC): ICD-10-CM

## 2025-02-13 PROCEDURE — 99213 OFFICE O/P EST LOW 20 MIN: CPT | Performed by: NURSE PRACTITIONER

## 2025-02-13 ASSESSMENT — PATIENT HEALTH QUESTIONNAIRE - PHQ9
7. TROUBLE CONCENTRATING ON THINGS, SUCH AS READING THE NEWSPAPER OR WATCHING TELEVISION: NOT AT ALL
6. FEELING BAD ABOUT YOURSELF - OR THAT YOU ARE A FAILURE OR HAVE LET YOURSELF OR YOUR FAMILY DOWN: NOT AT ALL
SUM OF ALL RESPONSES TO PHQ QUESTIONS 1-9: 3
SUM OF ALL RESPONSES TO PHQ QUESTIONS 1-9: 3
4. FEELING TIRED OR HAVING LITTLE ENERGY: NOT AT ALL
8. MOVING OR SPEAKING SO SLOWLY THAT OTHER PEOPLE COULD HAVE NOTICED. OR THE OPPOSITE, BEING SO FIGETY OR RESTLESS THAT YOU HAVE BEEN MOVING AROUND A LOT MORE THAN USUAL: NEARLY EVERY DAY
SUM OF ALL RESPONSES TO PHQ QUESTIONS 1-9: 3
10. IF YOU CHECKED OFF ANY PROBLEMS, HOW DIFFICULT HAVE THESE PROBLEMS MADE IT FOR YOU TO DO YOUR WORK, TAKE CARE OF THINGS AT HOME, OR GET ALONG WITH OTHER PEOPLE: NOT DIFFICULT AT ALL
2. FEELING DOWN, DEPRESSED OR HOPELESS: NOT AT ALL
9. THOUGHTS THAT YOU WOULD BE BETTER OFF DEAD, OR OF HURTING YOURSELF: NOT AT ALL
5. POOR APPETITE OR OVEREATING: NOT AT ALL
SUM OF ALL RESPONSES TO PHQ QUESTIONS 1-9: 3
1. LITTLE INTEREST OR PLEASURE IN DOING THINGS: NOT AT ALL
3. TROUBLE FALLING OR STAYING ASLEEP: NOT AT ALL
SUM OF ALL RESPONSES TO PHQ9 QUESTIONS 1 & 2: 0

## 2025-02-13 ASSESSMENT — ENCOUNTER SYMPTOMS: SHORTNESS OF BREATH: 0

## 2025-02-13 NOTE — PROGRESS NOTES
Chief Complaint   Patient presents with    Diabetes     Follow up         \"Have you been to the ER, urgent care clinic since your last visit?  Hospitalized since your last visit?\"    NO    “Have you seen or consulted any other health care providers outside of [facility] since your last visit?”    NO            Click Here for Release of Records Request           2/13/2025     1:01 PM   PHQ-9    Little interest or pleasure in doing things 0   Feeling down, depressed, or hopeless 0   Trouble falling or staying asleep, or sleeping too much 0   Feeling tired or having little energy 0   Poor appetite or overeating 0   Feeling bad about yourself - or that you are a failure or have let yourself or your family down 0   Trouble concentrating on things, such as reading the newspaper or watching television 0   Moving or speaking so slowly that other people could have noticed. Or the opposite - being so fidgety or restless that you have been moving around a lot more than usual 3   Thoughts that you would be better off dead, or of hurting yourself in some way 0   PHQ-2 Score 0   PHQ-9 Total Score 3   If you checked off any problems, how difficult have these problems made it for you to do your work, take care of things at home, or get along with other people? 0           Financial Resource Strain: Low Risk  (7/19/2024)    Overall Financial Resource Strain (CARDIA)     Difficulty of Paying Living Expenses: Not very hard      Food Insecurity: No Food Insecurity (2/10/2025)    Hunger Vital Sign     Worried About Running Out of Food in the Last Year: Never true     Ran Out of Food in the Last Year: Never true          Health Maintenance Due   Topic Date Due    Diabetic retinal exam  Never done    Hepatitis B vaccine (1 of 3 - 19+ 3-dose series) Never done    Diabetic Alb to Cr ratio (uACR) test  06/13/2024    Flu vaccine (1) 08/01/2024    COVID-19 Vaccine (1 - 2024-25 season) Never done    Lipids  01/23/2025

## 2025-02-13 NOTE — PROGRESS NOTES
Assessment/Plan:     1. Type 2 diabetes mellitus with hyperglycemia, without long-term current use of insulin (HCC)  -     Albumin/Creatinine Ratio, Urine  -     Hemoglobin A1C  -     Lipid Panel  -     Comprehensive Metabolic Panel  Continue current treatment.  Labs pending.    Return in about 6 months (around 8/13/2025) for Annual Physical Exam.     Discussed expected course/resolution/complications of diagnosis in detail with patient.    Medication risks/benefits/costs/interactions/alternatives discussed with patient.    Pt was given after visit summary which includes diagnoses, current medications & vitals.   Pt expressed understanding with the diagnosis and plan          Subjective:      Edvin Daniels is a 44 y.o. male who presents for had concerns including Diabetes (Follow up).     Cardiovascular Review:  The patient has diabetes, hyperlipidemia, and obesity.  Diet and Lifestyle: not attempting to follow a low fat, low cholesterol diet, sedentary, nonsmoker  Home BP Monitoring: is not measured at home.  Pertinent ROS: not taking medications regularly as instructed, no medication side effects noted, no TIA's, no chest pain on exertion, no dyspnea on exertion, no swelling of ankles.     He is up-to-date on routine eye exams from [facility].      Patient Active Problem List   Diagnosis    Generalized anxiety disorder    Chronic obstructive pulmonary disease (HCC)    Prediabetes    Left hand pain    Asthma    Overweight (BMI 25.0-29.9)    Anxiety and depression       Current Outpatient Medications   Medication Sig Dispense Refill    Dulaglutide (TRULICITY) 3 MG/0.5ML SOAJ Inject 3 mg into the skin once a week 2 mL 3    empagliflozin (JARDIANCE) 25 MG tablet Take 1 tablet by mouth daily 90 tablet 3    busPIRone (BUSPAR) 10 MG tablet Take 1 tablet by mouth 2 times daily      ZOLOFT 50 MG tablet Take by mouth      lamoTRIgine (LAMICTAL) 25 MG tablet Take 1 tablet by mouth daily       No current

## 2025-02-14 LAB
ALBUMIN SERPL-MCNC: 4 G/DL (ref 3.5–5)
ALBUMIN/GLOB SERPL: 1.3 (ref 1.1–2.2)
ALP SERPL-CCNC: 72 U/L (ref 45–117)
ALT SERPL-CCNC: 27 U/L (ref 12–78)
ANION GAP SERPL CALC-SCNC: 8 MMOL/L (ref 2–12)
AST SERPL-CCNC: 16 U/L (ref 15–37)
BILIRUB SERPL-MCNC: 0.4 MG/DL (ref 0.2–1)
BUN SERPL-MCNC: 27 MG/DL (ref 6–20)
BUN/CREAT SERPL: 24 (ref 12–20)
CALCIUM SERPL-MCNC: 9.2 MG/DL (ref 8.5–10.1)
CHLORIDE SERPL-SCNC: 106 MMOL/L (ref 97–108)
CHOLEST SERPL-MCNC: 148 MG/DL
CO2 SERPL-SCNC: 25 MMOL/L (ref 21–32)
CREAT SERPL-MCNC: 1.14 MG/DL (ref 0.7–1.3)
CREAT UR-MCNC: 154 MG/DL
EST. AVERAGE GLUCOSE BLD GHB EST-MCNC: 108 MG/DL
GLOBULIN SER CALC-MCNC: 3 G/DL (ref 2–4)
GLUCOSE SERPL-MCNC: 118 MG/DL (ref 65–100)
HBA1C MFR BLD: 5.4 % (ref 4–5.6)
HDLC SERPL-MCNC: 35 MG/DL
HDLC SERPL: 4.2 (ref 0–5)
LDLC SERPL CALC-MCNC: 84.6 MG/DL (ref 0–100)
MICROALBUMIN UR-MCNC: 1.75 MG/DL
MICROALBUMIN/CREAT UR-RTO: 11 MG/G (ref 0–30)
POTASSIUM SERPL-SCNC: 4.2 MMOL/L (ref 3.5–5.1)
PROT SERPL-MCNC: 7 G/DL (ref 6.4–8.2)
SODIUM SERPL-SCNC: 139 MMOL/L (ref 136–145)
TRIGL SERPL-MCNC: 142 MG/DL
VLDLC SERPL CALC-MCNC: 28.4 MG/DL

## 2025-04-24 ENCOUNTER — TELEPHONE (OUTPATIENT)
Age: 45
End: 2025-04-24

## 2025-04-24 NOTE — TELEPHONE ENCOUNTER
Tra from [facility] called stating they sent a fax on 4/4 for a approval or denial of a medication switch from JARDIANCE to STEGLATRO and still has not received anything back. Call back for Tra [phone] Reference# [id number]

## 2025-05-09 ENCOUNTER — TELEPHONE (OUTPATIENT)
Age: 45
End: 2025-05-09

## 2025-05-09 NOTE — TELEPHONE ENCOUNTER
Juvenal from [facility] called regarding message on 04/24/25 for approval or denial of  a medication.  He requested a call back [phone] extensions [phone].     05.09.25

## 2025-05-20 NOTE — TELEPHONE ENCOUNTER
Juvenal romero called wanting to know if fax was received. Juvenal is refax paperwork today asking for call back once fax is received.    BC[facility] [phone]  Reference [id number]

## 2025-05-27 ENCOUNTER — TELEPHONE (OUTPATIENT)
Age: 45
End: 2025-05-27

## 2025-06-03 DIAGNOSIS — E11.65 TYPE 2 DIABETES MELLITUS WITH HYPERGLYCEMIA, WITHOUT LONG-TERM CURRENT USE OF INSULIN (HCC): Primary | ICD-10-CM

## 2025-06-03 RX ORDER — DULAGLUTIDE 3 MG/.5ML
3 INJECTION, SOLUTION SUBCUTANEOUS WEEKLY
Qty: 2 ML | Refills: 3 | Status: SHIPPED | OUTPATIENT
Start: 2025-06-03

## 2025-06-04 ENCOUNTER — CLINICAL DOCUMENTATION (OUTPATIENT)
Facility: HOSPITAL | Age: 45
End: 2025-06-04

## 2025-06-04 NOTE — PROGRESS NOTES
[facility]  Specialty Pharmacy Update    Date: 06/04/25    Edvin Daniels 1980    Medication: Trulicity 3 mg/0.5 ml     Prior Authorization: through 6/3/2026    Prescription needs to be e-prescribed to:    [facility] [address] [place] [address] [address] [place] [address] [phone]      Patient's insurance requires he fill at [facility].     Corinne McEwen, [facility]  [address] [place] [address] [address]   [place] [address]  phone: [phone]   fax: [phone]

## 2025-07-08 ENCOUNTER — TELEPHONE (OUTPATIENT)
Age: 45
End: 2025-07-08

## 2025-07-09 ENCOUNTER — OFFICE VISIT (OUTPATIENT)
Age: 45
End: 2025-07-09
Payer: COMMERCIAL

## 2025-07-09 VITALS
BODY MASS INDEX: 31.08 KG/M2 | TEMPERATURE: 97 F | HEART RATE: 89 BPM | OXYGEN SATURATION: 97 % | HEIGHT: 71 IN | SYSTOLIC BLOOD PRESSURE: 117 MMHG | WEIGHT: 222 LBS | RESPIRATION RATE: 18 BRPM | DIASTOLIC BLOOD PRESSURE: 79 MMHG

## 2025-07-09 DIAGNOSIS — E11.65 TYPE 2 DIABETES MELLITUS WITH HYPERGLYCEMIA, WITHOUT LONG-TERM CURRENT USE OF INSULIN (HCC): Primary | ICD-10-CM

## 2025-07-09 DIAGNOSIS — Z51.81 MEDICATION MONITORING ENCOUNTER: ICD-10-CM

## 2025-07-09 PROCEDURE — 99213 OFFICE O/P EST LOW 20 MIN: CPT | Performed by: NURSE PRACTITIONER

## 2025-07-09 PROCEDURE — 3044F HG A1C LEVEL LT 7.0%: CPT | Performed by: NURSE PRACTITIONER

## 2025-07-09 ASSESSMENT — PATIENT HEALTH QUESTIONNAIRE - PHQ9
SUM OF ALL RESPONSES TO PHQ QUESTIONS 1-9: 0
1. LITTLE INTEREST OR PLEASURE IN DOING THINGS: NOT AT ALL
SUM OF ALL RESPONSES TO PHQ QUESTIONS 1-9: 0
2. FEELING DOWN, DEPRESSED OR HOPELESS: NOT AT ALL

## 2025-07-09 NOTE — PROGRESS NOTES
Chief Complaint   Patient presents with    Blood Work    Neurologic Problem         \"Have you been to the ER, urgent care clinic since your last visit?  Hospitalized since your last visit?\"    NO    “Have you seen or consulted any other health care providers outside of [facility] since your last visit?”    NO          Click Here for Release of Records Request           7/9/2025     1:27 PM   PHQ-9    Little interest or pleasure in doing things 0   Feeling down, depressed, or hopeless 0   PHQ-2 Score 0   PHQ-9 Total Score 0           Financial Resource Strain: Low Risk  (7/19/2024)    Overall Financial Resource Strain (CARDIA)     Difficulty of Paying Living Expenses: Not very hard      Food Insecurity: No Food Insecurity (2/10/2025)    Hunger Vital Sign     Worried About Running Out of Food in the Last Year: Never true     Ran Out of Food in the Last Year: Never true          Health Maintenance Due   Topic Date Due    Hepatitis B vaccine (1 of 3 - 19+ 3-dose series) Never done    COVID-19 Vaccine (1 - 2024-25 season) Never done    Diabetic retinal exam  08/08/2025

## 2025-07-09 NOTE — PROGRESS NOTES
Assessment/Plan:     1. Type 2 diabetes mellitus with hyperglycemia, without long-term current use of insulin (HCC)  -     Hemoglobin A1C; Future  2. Medication monitoring encounter  -     CBC with Auto Differential; Future  -     Comprehensive Metabolic Panel; Future       Return in about 6 months (around 1/9/2026) for Annual Physical Exam.     Discussed expected course/resolution/complications of diagnosis in detail with patient.    Medication risks/benefits/costs/interactions/alternatives discussed with patient.    Pt was given after visit summary which includes diagnoses, current medications & vitals.   Pt expressed understanding with the diagnosis and plan          Subjective:      Edvin Daniels is a 45 y.o. male who presents for had concerns including Blood Work and Neurologic Problem.     Cardiovascular Review:  The patient has diabetes, hyperlipidemia, and obesity.  Diet and Lifestyle: attempting to follow a low fat, low cholesterol diet, sedentary, nonsmoker  Home BP Monitoring: is not measured at home.  Pertinent ROS: not taking medications regularly as instructed, no medication side effects noted, no TIA's, no chest pain on exertion, no dyspnea on exertion, no swelling of ankles.     Patient Active Problem List   Diagnosis    Generalized anxiety disorder    Chronic obstructive pulmonary disease (HCC)    Prediabetes    Left hand pain    Asthma    Overweight (BMI 25.0-29.9)    Anxiety and depression       Current Outpatient Medications   Medication Sig Dispense Refill    empagliflozin (JARDIANCE) 25 MG tablet Take 1 tablet by mouth daily 90 tablet 3    busPIRone (BUSPAR) 10 MG tablet Take 1 tablet by mouth 2 times daily      ZOLOFT 50 MG tablet Take by mouth      lamoTRIgine (LAMICTAL) 25 MG tablet Take 1 tablet by mouth daily      Dulaglutide (TRULICITY) 3 MG/0.5ML SOAJ Inject 3 mg into the skin once a week (Patient not taking: Reported on 7/9/2025) 2 mL 3     No current facility-administered medications for

## 2025-07-11 LAB
ALBUMIN SERPL-MCNC: 4.3 G/DL (ref 3.5–5)
ALBUMIN/GLOB SERPL: 1.4 (ref 1.1–2.2)
ALP SERPL-CCNC: 80 U/L (ref 45–117)
ALT SERPL-CCNC: 31 U/L (ref 12–78)
ANION GAP SERPL CALC-SCNC: 5 MMOL/L (ref 2–12)
AST SERPL-CCNC: 14 U/L (ref 15–37)
BASOPHILS # BLD: 0.06 K/UL (ref 0–0.1)
BASOPHILS NFR BLD: 0.8 % (ref 0–1)
BILIRUB SERPL-MCNC: 0.5 MG/DL (ref 0.2–1)
BUN SERPL-MCNC: 17 MG/DL (ref 6–20)
BUN/CREAT SERPL: 16 (ref 12–20)
CALCIUM SERPL-MCNC: 9.5 MG/DL (ref 8.5–10.1)
CHLORIDE SERPL-SCNC: 105 MMOL/L (ref 97–108)
CO2 SERPL-SCNC: 26 MMOL/L (ref 21–32)
CREAT SERPL-MCNC: 1.06 MG/DL (ref 0.7–1.3)
DIFFERENTIAL METHOD BLD: NORMAL
EOSINOPHIL # BLD: 0.13 K/UL (ref 0–0.4)
EOSINOPHIL NFR BLD: 1.7 % (ref 0–7)
ERYTHROCYTE [DISTWIDTH] IN BLOOD BY AUTOMATED COUNT: 12.3 % (ref 11.5–14.5)
EST. AVERAGE GLUCOSE BLD GHB EST-MCNC: 111 MG/DL
GLOBULIN SER CALC-MCNC: 3 G/DL (ref 2–4)
GLUCOSE SERPL-MCNC: 100 MG/DL (ref 65–100)
HBA1C MFR BLD: 5.5 % (ref 4–5.6)
HCT VFR BLD AUTO: 47 % (ref 36.6–50.3)
HGB BLD-MCNC: 15.2 G/DL (ref 12.1–17)
IMM GRANULOCYTES # BLD AUTO: 0.01 K/UL (ref 0–0.04)
IMM GRANULOCYTES NFR BLD AUTO: 0.1 % (ref 0–0.5)
LYMPHOCYTES # BLD: 1.13 K/UL (ref 0.8–3.5)
LYMPHOCYTES NFR BLD: 15.2 % (ref 12–49)
MCH RBC QN AUTO: 30 PG (ref 26–34)
MCHC RBC AUTO-ENTMCNC: 32.3 G/DL (ref 30–36.5)
MCV RBC AUTO: 92.7 FL (ref 80–99)
MONOCYTES # BLD: 0.77 K/UL (ref 0–1)
MONOCYTES NFR BLD: 10.3 % (ref 5–13)
NEUTS SEG # BLD: 5.34 K/UL (ref 1.8–8)
NEUTS SEG NFR BLD: 71.9 % (ref 32–75)
NRBC # BLD: 0 K/UL (ref 0–0.01)
NRBC BLD-RTO: 0 PER 100 WBC
PLATELET # BLD AUTO: 277 K/UL (ref 150–400)
PMV BLD AUTO: 10.3 FL (ref 8.9–12.9)
POTASSIUM SERPL-SCNC: 4.3 MMOL/L (ref 3.5–5.1)
PROT SERPL-MCNC: 7.3 G/DL (ref 6.4–8.2)
RBC # BLD AUTO: 5.07 M/UL (ref 4.1–5.7)
SODIUM SERPL-SCNC: 136 MMOL/L (ref 136–145)
WBC # BLD AUTO: 7.4 K/UL (ref 4.1–11.1)

## (undated) DEVICE — SUPPLEMENT DIGESTIVE H2O SOL GI-EASE

## (undated) DEVICE — SNARE ENDOSCP DIA9MM SHTH DIA2.4MM CLD FOR POLYP EXACTO

## (undated) DEVICE — TRAP SURG QUAD PARABOLA SLOT DSGN SFTY SCRN TRAPEASE